# Patient Record
Sex: FEMALE | Race: WHITE
[De-identification: names, ages, dates, MRNs, and addresses within clinical notes are randomized per-mention and may not be internally consistent; named-entity substitution may affect disease eponyms.]

---

## 2020-08-24 ENCOUNTER — HOSPITAL ENCOUNTER (OUTPATIENT)
Dept: HOSPITAL 63 - LAB | Age: 75
Discharge: HOME | End: 2020-08-24
Attending: OBSTETRICS & GYNECOLOGY
Payer: MEDICARE

## 2020-08-24 DIAGNOSIS — N95.1: Primary | ICD-10-CM

## 2020-08-24 DIAGNOSIS — Z79.890: ICD-10-CM

## 2020-08-24 PROCEDURE — 82679 ASSAY OF ESTRONE: CPT

## 2020-08-24 PROCEDURE — 82627 DEHYDROEPIANDROSTERONE: CPT

## 2020-08-24 PROCEDURE — 36415 COLL VENOUS BLD VENIPUNCTURE: CPT

## 2020-08-24 PROCEDURE — 82670 ASSAY OF TOTAL ESTRADIOL: CPT

## 2020-08-24 PROCEDURE — 84144 ASSAY OF PROGESTERONE: CPT

## 2020-08-25 LAB
DHEA-S SERPL-MCNC: 34.8 UG/DL (ref 13.9–142.8)
ESTRADIOL SERPL-MCNC: 14.9 PG/ML
PROGEST SERPL-MCNC: 1.4 NG/ML

## 2021-07-02 ENCOUNTER — HOSPITAL ENCOUNTER (OUTPATIENT)
Dept: HOSPITAL 63 - RAD | Age: 76
End: 2021-07-02
Payer: MEDICARE

## 2021-07-02 DIAGNOSIS — M43.8X4: ICD-10-CM

## 2021-07-02 DIAGNOSIS — C50.412: Primary | ICD-10-CM

## 2021-07-02 PROCEDURE — 71046 X-RAY EXAM CHEST 2 VIEWS: CPT

## 2021-07-02 NOTE — RAD
EXAM: Chest, 2 views.



HISTORY: Breast cancer.



COMPARISON: None.



FINDINGS: 2 views of the chest are obtained. There is bilateral lower lobe and lingular linear atelec
tasis or scarring. There is no consolidation, pleural effusion or pneumothorax. The heart is normal i
n size. There is a chronic mild to moderate wedge compression deformity at an upper thoracic vertebra
l level. There are left axillary clips.



IMPRESSION: Bilateral lower lobe and lingular linear atelectasis or scarring.



Electronically signed by: Jami Aleman MD (7/2/2021 3:55 PM) [id number]

## 2021-08-17 ENCOUNTER — HOSPITAL ENCOUNTER (INPATIENT)
Dept: HOSPITAL 63 - ER | Age: 76
LOS: 3 days | Discharge: HOME | DRG: 872 | End: 2021-08-20
Attending: INTERNAL MEDICINE | Admitting: INTERNAL MEDICINE
Payer: MEDICARE

## 2021-08-17 VITALS — WEIGHT: 160.06 LBS | HEIGHT: 63 IN | BODY MASS INDEX: 28.36 KG/M2

## 2021-08-17 VITALS — SYSTOLIC BLOOD PRESSURE: 117 MMHG | DIASTOLIC BLOOD PRESSURE: 52 MMHG

## 2021-08-17 VITALS — SYSTOLIC BLOOD PRESSURE: 110 MMHG | DIASTOLIC BLOOD PRESSURE: 58 MMHG

## 2021-08-17 VITALS — SYSTOLIC BLOOD PRESSURE: 129 MMHG | DIASTOLIC BLOOD PRESSURE: 48 MMHG

## 2021-08-17 VITALS — DIASTOLIC BLOOD PRESSURE: 58 MMHG | SYSTOLIC BLOOD PRESSURE: 94 MMHG

## 2021-08-17 DIAGNOSIS — I47.1: ICD-10-CM

## 2021-08-17 DIAGNOSIS — C50.919: ICD-10-CM

## 2021-08-17 DIAGNOSIS — I48.91: ICD-10-CM

## 2021-08-17 DIAGNOSIS — E78.5: ICD-10-CM

## 2021-08-17 DIAGNOSIS — Z20.822: ICD-10-CM

## 2021-08-17 DIAGNOSIS — M79.7: ICD-10-CM

## 2021-08-17 DIAGNOSIS — F03.90: ICD-10-CM

## 2021-08-17 DIAGNOSIS — D69.6: ICD-10-CM

## 2021-08-17 DIAGNOSIS — F05: ICD-10-CM

## 2021-08-17 DIAGNOSIS — F32.9: ICD-10-CM

## 2021-08-17 DIAGNOSIS — Z92.3: ICD-10-CM

## 2021-08-17 DIAGNOSIS — Z79.899: ICD-10-CM

## 2021-08-17 DIAGNOSIS — Z88.8: ICD-10-CM

## 2021-08-17 DIAGNOSIS — M19.90: ICD-10-CM

## 2021-08-17 DIAGNOSIS — K21.9: ICD-10-CM

## 2021-08-17 DIAGNOSIS — A41.9: Primary | ICD-10-CM

## 2021-08-17 DIAGNOSIS — J45.909: ICD-10-CM

## 2021-08-17 DIAGNOSIS — G62.9: ICD-10-CM

## 2021-08-17 DIAGNOSIS — E87.2: ICD-10-CM

## 2021-08-17 LAB
% BANDS: 14 % (ref 0–9)
% LYMPHS: 8 % (ref 24–48)
% MONOS: 1 % (ref 0–10)
% SEGS: 76 % (ref 35–66)
ALBUMIN SERPL-MCNC: 3.5 G/DL (ref 3.4–5)
ALBUMIN/GLOB SERPL: 1 {RATIO} (ref 1–1.7)
ALP SERPL-CCNC: 92 U/L (ref 46–116)
ALT SERPL-CCNC: 46 U/L (ref 14–59)
AMORPH SED URNS QL MICRO: PRESENT /HPF
ANION GAP SERPL CALC-SCNC: 9 MMOL/L (ref 6–14)
APTT PPP: YELLOW S
AST SERPL-CCNC: 30 U/L (ref 15–37)
BACTERIA #/AREA URNS HPF: 0 /HPF
BASOPHILS # BLD AUTO: 0.1 X10^3/UL (ref 0–0.2)
BASOPHILS NFR BLD: 1 % (ref 0–3)
BILIRUB SERPL-MCNC: 0.9 MG/DL (ref 0.2–1)
BILIRUB UR QL STRIP: (no result)
BUN/CREAT SERPL: 19 (ref 6–20)
CA-I SERPL ISE-MCNC: 17 MG/DL (ref 7–20)
CALCIUM SERPL-MCNC: 8.2 MG/DL (ref 8.5–10.1)
CHLORIDE SERPL-SCNC: 100 MMOL/L (ref 98–107)
CO2 SERPL-SCNC: 27 MMOL/L (ref 21–32)
CREAT SERPL-MCNC: 0.9 MG/DL (ref 0.6–1)
EOSINOPHIL NFR BLD AUTO: 1 % (ref 0–5)
EOSINOPHIL NFR BLD: 0 % (ref 0–3)
EOSINOPHIL NFR BLD: 0.1 X10^3/UL (ref 0–0.7)
ERYTHROCYTE [DISTWIDTH] IN BLOOD BY AUTOMATED COUNT: 13.5 % (ref 11.5–14.5)
FIBRINOGEN PPP-MCNC: CLEAR MG/DL
GFR SERPLBLD BASED ON 1.73 SQ M-ARVRAT: 60.9 ML/MIN
GLOBULIN SER-MCNC: 3.4 G/DL (ref 2.2–3.8)
GLUCOSE SERPL-MCNC: 87 MG/DL (ref 70–99)
GLUCOSE UR STRIP-MCNC: (no result) MG/DL
HCT VFR BLD CALC: 41.1 % (ref 36–47)
HGB BLD-MCNC: 13.8 G/DL (ref 12–15.5)
LYMPHOCYTES # BLD: 0.7 X10^3/UL (ref 1–4.8)
LYMPHOCYTES NFR BLD AUTO: 4 % (ref 24–48)
MCH RBC QN AUTO: 31 PG (ref 25–35)
MCHC RBC AUTO-ENTMCNC: 34 G/DL (ref 31–37)
MCV RBC AUTO: 93 FL (ref 79–100)
MONO #: 0.7 X10^3/UL (ref 0–1.1)
MONOCYTES NFR BLD: 4 % (ref 0–9)
NEUT #: 14.5 X10^3UL (ref 1.8–7.7)
NEUTROPHILS NFR BLD AUTO: 91 % (ref 31–73)
NITRITE UR QL STRIP: (no result)
PLATELET # BLD AUTO: 141 X10^3/UL (ref 140–400)
PLATELET # BLD EST: ADEQUATE 10*3/UL
POTASSIUM SERPL-SCNC: 4.3 MMOL/L (ref 3.5–5.1)
PROT SERPL-MCNC: 6.9 G/DL (ref 6.4–8.2)
RBC # BLD AUTO: 4.44 X10^6/UL (ref 3.5–5.4)
RBC #/AREA URNS HPF: (no result) /HPF (ref 0–2)
SODIUM SERPL-SCNC: 136 MMOL/L (ref 136–145)
SP GR UR STRIP: 1.02
SQUAMOUS #/AREA URNS LPF: (no result) /LPF
UROBILINOGEN UR-MCNC: 0.2 MG/DL
WBC # BLD AUTO: 16 X10^3/UL (ref 4–11)
WBC #/AREA URNS HPF: 0 /HPF (ref 0–4)

## 2021-08-17 PROCEDURE — 81001 URINALYSIS AUTO W/SCOPE: CPT

## 2021-08-17 PROCEDURE — 70450 CT HEAD/BRAIN W/O DYE: CPT

## 2021-08-17 PROCEDURE — 71045 X-RAY EXAM CHEST 1 VIEW: CPT

## 2021-08-17 PROCEDURE — 80202 ASSAY OF VANCOMYCIN: CPT

## 2021-08-17 PROCEDURE — 82947 ASSAY GLUCOSE BLOOD QUANT: CPT

## 2021-08-17 PROCEDURE — 36415 COLL VENOUS BLD VENIPUNCTURE: CPT

## 2021-08-17 PROCEDURE — U0003 INFECTIOUS AGENT DETECTION BY NUCLEIC ACID (DNA OR RNA); SEVERE ACUTE RESPIRATORY SYNDROME CORONAVIRUS 2 (SARS-COV-2) (CORONAVIRUS DISEASE [COVID-19]), AMPLIFIED PROBE TECHNIQUE, MAKING USE OF HIGH THROUGHPUT TECHNOLOGIES AS DESCRIBED BY CMS-2020-01-R: HCPCS

## 2021-08-17 PROCEDURE — 84443 ASSAY THYROID STIM HORMONE: CPT

## 2021-08-17 PROCEDURE — 96365 THER/PROPH/DIAG IV INF INIT: CPT

## 2021-08-17 PROCEDURE — 80053 COMPREHEN METABOLIC PANEL: CPT

## 2021-08-17 PROCEDURE — 96375 TX/PRO/DX INJ NEW DRUG ADDON: CPT

## 2021-08-17 PROCEDURE — 85610 PROTHROMBIN TIME: CPT

## 2021-08-17 PROCEDURE — 85730 THROMBOPLASTIN TIME PARTIAL: CPT

## 2021-08-17 PROCEDURE — 82803 BLOOD GASES ANY COMBINATION: CPT

## 2021-08-17 PROCEDURE — 93306 TTE W/DOPPLER COMPLETE: CPT

## 2021-08-17 PROCEDURE — 87426 SARSCOV CORONAVIRUS AG IA: CPT

## 2021-08-17 PROCEDURE — 82140 ASSAY OF AMMONIA: CPT

## 2021-08-17 PROCEDURE — 85025 COMPLETE CBC W/AUTO DIFF WBC: CPT

## 2021-08-17 PROCEDURE — 84484 ASSAY OF TROPONIN QUANT: CPT

## 2021-08-17 PROCEDURE — 93005 ELECTROCARDIOGRAM TRACING: CPT

## 2021-08-17 PROCEDURE — 96366 THER/PROPH/DIAG IV INF ADDON: CPT

## 2021-08-17 PROCEDURE — 80061 LIPID PANEL: CPT

## 2021-08-17 PROCEDURE — 83605 ASSAY OF LACTIC ACID: CPT

## 2021-08-17 PROCEDURE — 85007 BL SMEAR W/DIFF WBC COUNT: CPT

## 2021-08-17 PROCEDURE — 83735 ASSAY OF MAGNESIUM: CPT

## 2021-08-17 PROCEDURE — 87040 BLOOD CULTURE FOR BACTERIA: CPT

## 2021-08-17 RX ADMIN — VANCOMYCIN HYDROCHLORIDE PRN EACH: 1 INJECTION, POWDER, LYOPHILIZED, FOR SOLUTION INTRAVENOUS at 22:49

## 2021-08-17 RX ADMIN — PIPERACILLIN SODIUM AND TAZOBACTAM SODIUM SCH MLS/HR: 4; .5 INJECTION, POWDER, LYOPHILIZED, FOR SOLUTION INTRAVENOUS at 21:57

## 2021-08-17 NOTE — RAD
Single AP view of the chest.



Comparison:  7/2/2021.



Indication: Code stroke



Findings: 



Stable degenerative change of bilateral shoulders. The heart is at the upper limits of normal but sta
ble.  There is no pneumothorax or effusion. No air space or interstitial disease.



Impression: 



1. No acute cardiopulmonary process.





Electronically signed by: Bryan Corrales MD (8/17/2021 11:19 AM) [id number]

## 2021-08-17 NOTE — EKG
[facility] [address], [place] [address]

Test Date:    2021               Test Time:    10:35:56

Pat Name:     AMBER SAMS            Department:   

Patient ID:   [id number]           Room:          

Gender:       F                        Technician:   SHAYLEE

:          1945               Requested By: NOLVIA DUVAL

Order Number: [id number]            Reading MD:     

                                 Measurements

Intervals                              Axis          

Rate:         167                      P:            

MD:                                    QRS:          14

QRSD:         116                      T:            24

QT:           284                                    

QTc:          473                                    

                           Interpretive Statements

IRREGULAR RHYTHM, NO P-WAVE FOUND

VENTRICULAR PREMATURE COMPLEX(ES)

R-S TRANSITION ZONE IN V LEADS DISPLACED TO THE RIGHT

INCOMPLETE RIGHT BUNDLE BRANCH BLOCK

RVH WITH REPOLARIZATION ABNORMALITY

QRS(T) CONTOUR ABNORMALITY

CONSISTENT WITH INFERIOR INFARCT

PROBABLY OLD

ABNORMAL ECG

RI6.02

No previous ECG available for comparison

## 2021-08-17 NOTE — PHYS DOC
Past History


Alcohol Use:  None





Adult General


Chief Complaint


Chief Complaint:  NEURO SYMPTOMS/DEFICITS





HPI


HPI





Patient is a 76-year-old female presenting via EMS for altered mental status.  

Patient initially brought in via EMS due to suspect stroke.  Patient's last 

known normal was night prior at 9 PM.  Patient reportedly woke up and was more 

confused and "talking gibberish"per  who lives with patient.  He states 

there was no trauma or falls but admits patient was unstable on her feet and 

having trouble performing simple tasks such as opening the gate in their yard 

which is something she has never struggled with before.  He also reports that at

times, she became incomprehensible when speaking prompting him to contact EMS as

he was concern for potential stroke.   does disclose that patient has 

history of left-sided breast cancer, unsure the name of it, but recently had a 

lumpectomy in June 2021 at [facility] and has been undergoing 

radiation treatments there as well.





Review of Systems


Review of Systems


ROS unobtainable due to patient condition/mentation





Allergies


Allergies





Allergies








Coded Allergies Type Severity Reaction Last Updated Verified


 


  oxycodone Allergy Unknown  8/17/21 Yes











Physical Exam


Physical Exam


Constitutional: 


Patient appears pleasantly confused talking in incomprehensible sentences





HEENT:


Head: Normocephalic and atraumatic.


TMs clear, no hemotympanum


Conjunctivae and EOM are normal. Pupils are equal, round, and reactive to light.


Oropharynx is clear and moist.


No hematomas or lacerations or abrasions to face or scalp


OP clear, no blood, no malocclusion, dentition intact


Nares clear, no nasal septal hematoma


Midface stable





Neck:


C-spine midline nontender, no step-offs





Cardiovascular: 


Normal rate, regular rhythm and normal heart sounds.





Pulmonary/Chest: 


Effort normal and breath sounds normal. No respiratory distress. No wheezes. CTA

bilaterally





Abdominal: 


Soft. Bowel sounds are normal. Pt exhibits no distension. There is no 

tenderness.





Musculoskeletal: 


No bony tenderness to extremities, no deformities, full ROM extremities


Chest wall stable


Pelvis stable and non-tender


No vertebral TTP and spine without stepoffs





Neurological: 


Pt is alert and oriented to person only which is not her baseline


Moving all extremities willfully, able to wiggle all fingers and toes


No meningeal signs/nuchal rigidity


Motor and sensory function fully intact


Downgoing toes bilaterally








Skin: 


Skin is warm and dry. No abrasions, no lacerations.  Patient's left breast is 

red and hot to touch and appears tender with palpation versus contralateral side





Psychiatric: 


Behavior is inappropriate.  Poor judgment and memory.  Normal affect and 

energetic mood.  Unable to fully assess due to underlying mentation





Current Patient Data


Vital Signs





Vital Signs








  Date Time  Temp Pulse Resp B/P (MAP) Pulse Ox O2 Delivery O2 Flow Rate FiO2


 


8/17/21 10:35 101.8 133 16 130/75 97 Room Air  








Vital Signs








  Date Time  Temp Pulse Resp B/P (MAP) Pulse Ox O2 Delivery O2 Flow Rate FiO2


 


8/17/21 17:13  77 16 125/74 (91) 98 Room Air  


 


8/17/21 10:35 101.8       








Lab Results





Current Medications








 Medications


  (Trade)  Dose


 Ordered  Sig/Bryant


 Route


 PRN Reason  Start Time


 Stop Time Status Last Admin


Dose Admin


 


 Acetaminophen


  (Tylenol Supp)  650 mg  1X  ONCE


 [place]


   8/17/21 11:00


 8/17/21 11:05 DC 8/17/21 11:33





 


 Sodium Chloride  1,650 ml @ 


 1,650 mls/hr  Q1H


 IV


   8/17/21 11:00


 8/17/21 21:28 DC 8/17/21 11:41





 


 Sodium Chloride  500 ml @ 


 1,000 mls/hr  PRN Q30MIN  PRN


 IV


 SEE COMMENTS  8/17/21 11:00


 8/20/21 19:34 DC  





 


 Vancomycin HCl


  (Vanco Per


 Pharmacy)  1 each  PRN DAILY  PRN


 MC


 SEE COMMENTS  8/17/21 11:00


 8/20/21 19:34 DC 8/19/21 13:53





 


 Piperacillin Sod/


 Tazobactam Sod


 4.5 gm/Sodium


 Chloride  50 ml @ 


 100 mls/hr  Q6HRS


 IV


   8/17/21 12:00


 8/17/21 21:32 DC 8/17/21 11:40





 


 Sodium Chloride  50 ml @ As


 Directed  STK-MED ONCE


 .ROUTE


   8/17/21 11:22


 8/17/21 11:22 DC  





 


 Piperacillin Sod/


 Tazobactam Sod


  (Zosyn)  4.5 gm  STK-MED ONCE


 IV


   8/17/21 11:22


 8/17/21 11:22 DC  





 


 Vancomycin HCl


 1.75 gm/Sodium


 Chloride  500 ml @ 


 250 mls/hr  1X  ONCE


 IV


   8/17/21 12:30


 8/17/21 14:29 DC 8/17/21 12:46





 


 Diltiazem HCl


  (Cardizem Iv


 Push)  20 mg  1X  ONCE


 IVP


   8/17/21 12:15


 8/17/21 12:20 DC 8/17/21 12:23





 


 Fentanyl Citrate


  (Fentanyl 2ml


 Vial)  75 mcg  1X  ONCE


 IVP


   8/17/21 16:00


 8/17/21 16:04 DC 8/17/21 15:58





 


 Sodium Chloride  1,000 ml @ 


 125 mls/hr  1X  ONCE


 IV


   8/17/21 16:45


 8/18/21 00:44 DC 8/17/21 16:45





 


 Ondansetron HCl


  (Zofran)  4 mg  PRN Q4HRS  PRN


 IVP


 NAUSEA/VOMITING 1st choice  8/17/21 21:00


 8/18/21 20:59 DC  





 


 Diltiazem HCl


  (Cardizem Iv


 Push)  20 mg  1X  ONCE


 IVP


   8/17/21 21:00


 8/17/21 21:00 DC  





 


 Diltiazem HCl 125


 mg/Sodium Chloride  125 ml @ 5


 mls/hr  CONT  PRN


 IV


 SEE I/O RECORD  8/17/21 21:00


 8/17/21 21:00 DC  





 


 Haloperidol


 Lactate


  (Haldol)  5 mg  1X  ONCE


 IVP


   8/17/21 21:00


 8/17/21 21:00 DC  





 


 Fentanyl Citrate


  (Fentanyl 2ml


 Vial)  50 mcg  PRN Q3HRS  ONCE


 IVP


   8/17/21 21:00


 8/17/21 21:00 DC  





 


 Sodium Chloride  1,000 ml @ 


 1,000 mls/hr  1X  ONCE


 IV


   8/17/21 21:00


 8/17/21 21:00 DC  





 


 Sodium Chloride  1,000 ml @ 


 125 mls/hr  1X  ONCE


 IV


   8/17/21 23:00


 8/17/21 21:00 DC  





 


 Diltiazem HCl


  (Cardizem Iv


 Push)  25 mg  STK-MED ONCE


 IVP


   8/17/21 21:09


 8/17/21 21:09 DC  





 


 Sodium Chloride  1,000 ml @ 


 1,000 mls/hr  1X  ONCE


 IV


   8/17/21 21:15


 8/17/21 21:59 DC 8/17/21 21:55





 


 Diltiazem HCl 125


 mg/Sodium Chloride  125 ml @ 5


 mls/hr  CONT  PRN


 IV


 SEE I/O RECORD  8/17/21 22:00


 8/20/21 19:34 DC 8/19/21 05:51





 


 Diltiazem HCl


  (Cardizem Iv


 Push)  20 mg  1X  ONCE


 IVP


   8/17/21 21:00


 8/17/21 21:26 DC 8/17/21 21:10





 


 Haloperidol


 Lactate


  (Haldol)  5 mg  1X  ONCE


 IVP


   8/17/21 21:00


 8/17/21 21:26 DC 8/17/21 21:57





 


 Fentanyl Citrate


  (Fentanyl 2ml


 Vial)  50 mcg  PRN Q3HRS  PRN


 IVP


 PAIN  8/17/21 21:00


 8/20/21 19:34 DC 8/18/21 01:37





 


 Sodium Chloride  1,000 ml @ 


 125 mls/hr  1X  ONCE


 IV


   8/17/21 23:00


 8/18/21 06:59 DC 8/17/21 23:00





 


 Piperacillin Sod/


 Tazobactam Sod


 4.5 gm/Sodium


 Chloride  50 ml @ 


 100 mls/hr  Q6H


 IV


   8/17/21 22:00


 8/20/21 19:34 DC 8/20/21 16:49





 


 Vancomycin HCl 1


 gm/Sodium Chloride  250 ml @ 


 250 mls/hr  Q24H


 IV


   8/18/21 13:00


 8/19/21 13:46 DC 8/19/21 13:41





 


 Vancomycin HCl


  (Vancomycin


 Trough Level)  1 each  1X  ONCE


 MC


   8/19/21 12:30


 8/19/21 12:31 DC 8/19/21 12:30





 


 Lactobacillus


 Rhamnosus


  (Culturelle)  1 cap  BID


 PO


   8/18/21 09:00


 8/20/21 19:34 DC 8/20/21 08:06





 


 Adenosine


  (Adenocard)  6 mg  STK-MED ONCE


 IV


   8/18/21 10:39


 8/18/21 10:39 DC  





 


 Diltiazem HCl


  (Cardizem Iv


 Push)  25 mg  STK-MED ONCE


 IVP


   8/18/21 10:46


 8/18/21 10:46 DC  





 


 Diltiazem HCl


  (Cardizem Iv


 Push)  10 mg  1X  ONCE


 IVP


   8/18/21 11:00


 8/18/21 11:08 DC 8/18/21 11:01





 


 Adenosine


  (Adenocard)  12 mg  1X  ONCE


 IV


   8/18/21 11:00


 8/18/21 11:08 DC 8/18/21 11:00





 


 Digoxin


  (Lanoxin)  500 mcg  1X  ONCE


 IV


   8/18/21 15:30


 8/18/21 15:34 DC 8/18/21 15:38





 


 Digoxin


  (Lanoxin)  125 mcg  DAILY


 PO


   8/19/21 09:00


 8/19/21 09:30 DC  





 


 Diltiazem HCl


  (Cardizem 24hr


 Cd)  240 mg  DAILY


 PO


   8/19/21 09:00


 8/20/21 19:34 DC 8/20/21 08:08





 


 Acetaminophen


  (Tylenol)  325 mg  STK-MED ONCE


 PO


   8/18/21 20:04


 8/18/21 20:04 DC  





 


 Acetaminophen


  (Tylenol)  650 mg  PRN Q6HRS  PRN


 PO


 MILD PAIN / TEMP > 100.3'F  8/18/21 20:15


 8/20/21 19:34 DC 8/20/21 14:37





 


 Metoprolol


 Succinate


  (Toprol Xl)  25 mg  DAILY


 PO


   8/19/21 09:00


 8/19/21 09:30 DC  





 


 Atorvastatin


 Calcium


  (Lipitor)  10 mg  QHS


 PO


   8/19/21 21:00


 8/20/21 19:34 DC 8/19/21 19:42





 


 Pantoprazole


 Sodium


  (Protonix)  40 mg  DAILYAC


 PO


   8/19/21 09:00


 8/20/21 19:34 DC 8/20/21 08:08





 


 Digoxin


  (Lanoxin)  250 mcg  1X  ONCE


 IV


   8/19/21 12:15


 8/19/21 12:16 DC 8/19/21 12:08





 


 Vancomycin HCl 1


 gm/Sodium Chloride  250 ml @ 


 250 mls/hr  Q12H


 IV


   8/20/21 01:00


 8/20/21 19:34 DC 8/20/21 00:51





 


 Vancomycin HCl


  (Vancomycin


 Trough Level)  1 each  1X  ONCE


 MC


   8/21/21 12:30


 8/20/21 19:34 DC  





 


 Amiodarone HCl


  (Cordarone)  200 mg  BID


 PO


   8/19/21 17:00


 8/20/21 19:34 DC 8/20/21 08:07





 


 Aspirin


  (Aspirin Enteric


 Coated)  81 mg  DAILYWBKFT


 PO


   8/20/21 08:00


 8/20/21 19:34 DC 8/20/21 08:06





 


 Amiodarone HCl


 150 mg/Dextrose  103 ml @ 


 618 mls/hr  1X  ONCE


 IVP


   8/19/21 18:00


 8/19/21 18:09 DC 8/19/21 19:40





 


 Amiodarone HCl


 150 mg/Dextrose  103 ml @ 


 600 mls/hr  1X  ONCE


 IV


   8/19/21 18:00


 8/19/21 18:08 DC  





 


 Amiodarone HCl


 450 mg/Dextrose  259 ml @ 


 33.33 mls/


 hr  CONT  PRN


 IV


 SEE I/O RECORD  8/19/21 18:00


 8/20/21 17:59 DC 8/20/21 05:05





 


 Amiodarone HCl


  (Cordarone)  150 mg  STK-MED ONCE


 IVP


   8/20/21 02:32


 8/20/21 02:32 DC  














EKG


EKG


Initial EKG ordered and interpreted by myself at 1036 hrs. as in irregular 

rhythm at 167 bpm, prolonged QTC at 473 otherwise unremarkable intervals, no 

axis deviation, gross artifact due to tremors with concern for T wave inversion 

in leads V1 through V4, no obvious STEMI


Repeat EKG ordered and interpreted by myself at 1245 hrs. as sinus rhythm at 95 

bpm with several PVCs present, prolonged QTC at 468 otherwise unremarkable 

intervals, left axis deviation, T wave inversion noted in leads V1 through V3 

otherwise no obvious ischemic findings, no STEMI


Repeat EKG ordered and interpreted by myself at 1747 hrs. as sinus rhythm at 80 

bpm, unremarkable intervals, persistent T wave inversions noted in leads V1 

through V3 otherwise no acute ischemic findings, no STEMI





Radiology/Procedures


Radiology/Procedures





CT HEAD





INDICATION: Code stroke





COMPARISON: None Available.





Exposure: One or more of the following individualized dose reduction techniques 

were utilized for this examination:  1. Automated exposure control  2. 

Adjustment of the mA and/or kV according to patient size  3. Use of iterative 

reconstruction technique





TECHNIQUE: 5 mm contiguous axial images were obtained from the skull base to the

vertex in both bone and soft tissue algorithm.  





FINDINGS: 


Mild prominent bilateral periventricular white matter hypodensities likely 

chronic small vessel ischemic disease  





No evidence of acute intracranial hemorrhage.   No extra-axial fluid 

collections.





No mass effect or midline shift. The lateral ventricles are prominent.  Basal 

cisterns are patent.





No fractures identified.Gray-white differentiation is preserved.Globes and 

orbits are within normal limits.   Paranasal sinuses and mastoid air cells are 

clear.   





IMPRESSION:








1. No acute intracranial findings.





2. Mild prominent appearing lateral ventricles could be age related or normal 

pressure hydrocephalus. 





/////////////////////////////


Single AP view of the chest.





Comparison:  7/2/2021.





Indication: Code stroke





Findings: 





Stable degenerative change of bilateral shoulders. The heart is at the upper 

limits of normal but stable.  There is no pneumothorax or effusion. No air space

or interstitial disease.





Impression: 





1. No acute cardiopulmonary process.








Electronically signed by: Bryan Corrales MD (8/17/2021 11:19 AM) [id number]











Heart Score


C/O Chest Pain:  N/A


HEART Score for Chest Pain:  








HEART Score for Chest Pain Response (Comments) Value


 


History Moderately Suspicious 1


 


ECG Significant ST Depression 2


 


Age > 65 2


 


Risk Factors >3 Risk Factors or Hx CAD 2


 


Troponin < Normal Limit 0


 


Total  7








Risk Factors:


Risk Factors:  DM, Current or recent (<one month) smoker, HTN, HLP, family 

history of CAD, obesity.


Risk Scores:


Risk Factors:  DM, Current or recent (<one month) smoker, HTN, HLP, family 

history of CAD, obesity.





Course & Med Decision Making


Course & Med Decision Making


Code stroke initially called on arrival with unremarkable CT head and POC 

glucose obtained


Patient brought back to ER room for evaluation when formal vitals obtained 

concerning for tachycardia and fever.  Clinical presentation more consistent 

with infectious etiology versus stroke


History limited, physical examination and ER work-up concerning for sepsis with 

source being cellulitis likely causing patient to be delirious.  Appropriate 

sepsis guidelines obtained, IV fluids, lactic, blood cultures, and IV 

antibiotics initiated


Patient did have episode of atrial fibrillation with RVR which was unknown to 

her and , patient converted back to normal sinus rhythm after Cardizem 

bolus


Attempts were made to transfer patient to [facility]; however, 

due to current COVID-19 pandemic hospital was full and there was a lengthy wait 

time


While waiting transfer, patient tolerated ER intervention provided well with 

improvement in mentation back to AAOx3 state.  Decision was made to contact 

admitting hospitalist at [facility] who ultimately accepted 

patient at [facility] for immediate admission


I updated both patient and  on plan of care, both were amenable.  All 

questions and concerns addressed.  Patient full CODE STATUS on hospital 

admission








Critical Care Time


This patient required critical care. Due to the fact that the patient required a

significant amount of one on one physician - patient contact time, ordering and 

review of studies, arranging urgent treatment with development of a management 

plan, evaluation of patients response to treatment with frequent reassessments,

and discussions with other providers this patient required 50 minutes of 

critical care time. Critical care time was indicated due to the inherent 

instability and/or potential for instability in this patient. The critical care 

time that is allocated to this patient is above and beyond any time spent on any

other billable procedures performed on this patient.





Dragon Disclaimer


Dragon Disclaimer


This electronic medical record was generated, in whole or in part, using a voice

recognition dictation system.





Departure


Departure:


Impression:  


   Primary Impression:  


   Sepsis due to cellulitis


   Additional Impressions:  


   Breast cancer


   Delirium due to medical condition with behavioral disturbance


Disposition:  09 ADMITTED AS INPATIENT


Admitting Physician:  Venancio Ames


Referrals:  


PORFIRIO FARRELL MD (PCP)





Problem Qualifiers











NOLVIA DUVAL DO                 Aug 17, 2021 10:43

## 2021-08-17 NOTE — RAD
CT HEAD



INDICATION: Code stroke



COMPARISON: None Available.



Exposure: One or more of the following individualized dose reduction techniques were utilized for thi
s examination:  1. Automated exposure control  2. Adjustment of the mA and/or kV according to patient
 size  3. Use of iterative reconstruction technique



TECHNIQUE: 5 mm contiguous axial images were obtained from the skull base to the vertex in both bone 
and soft tissue algorithm.  



FINDINGS: 

Mild prominent bilateral periventricular white matter hypodensities likely chronic small vessel ische
earlene disease  



No evidence of acute intracranial hemorrhage.   No extra-axial fluid collections.



No mass effect or midline shift. The lateral ventricles are prominent.  Basal cisterns are patent.



No fractures identified.Gray-white differentiation is preserved.Globes and orbits are within normal l
imits.   Paranasal sinuses and mastoid air cells are clear.   



IMPRESSION:





1. No acute intracranial findings.



2. Mild prominent appearing lateral ventricles could be age related or normal pressure hydrocephalus.
 





**********FOR INTERNAL CODING PURPOSES**********



Critical result:



Findings discussed with  ER physician at 8/17/2021 10:32 AM.



RESULT CODE: (C)  



  







Electronically signed by: Bryan Middleton MD (8/17/2021 10:37 AM) [id number]

## 2021-08-17 NOTE — EKG
[facility] [address], [place] [address]

Test Date:    2021               Test Time:    12:38:38

Pat Name:     AMBER SAMS            Department:   

Patient ID:   [id number]           Room:          

Gender:       F                        Technician:   SHAYLEE

:          1945               Requested By: NOLVIA DUVAL

Order Number: [id number]            Reading MD:     

                                 Measurements

Intervals                              Axis          

Rate:         95                       P:            88

IL:           140                      QRS:          3

QRSD:         116                      T:            28

QT:           370                                    

QTc:          468                                    

                           Interpretive Statements

SINUS RHYTHM

ATRIAL PREMATURE COMPLEX(ES)

R-S TRANSITION ZONE IN V LEADS DISPLACED TO THE RIGHT

INCOMPLETE RIGHT BUNDLE BRANCH BLOCK

RVH WITH REPOLARIZATION ABNORMALITY

QRS(T) CONTOUR ABNORMALITY

CONSIDER INFERIOR INFARCT

ABNORMAL ECG

RI6.02

No previous ECG available for comparison

## 2021-08-18 VITALS — SYSTOLIC BLOOD PRESSURE: 130 MMHG | DIASTOLIC BLOOD PRESSURE: 63 MMHG

## 2021-08-18 VITALS — SYSTOLIC BLOOD PRESSURE: 144 MMHG | DIASTOLIC BLOOD PRESSURE: 62 MMHG

## 2021-08-18 VITALS — SYSTOLIC BLOOD PRESSURE: 114 MMHG | DIASTOLIC BLOOD PRESSURE: 42 MMHG

## 2021-08-18 VITALS — DIASTOLIC BLOOD PRESSURE: 69 MMHG | SYSTOLIC BLOOD PRESSURE: 142 MMHG

## 2021-08-18 VITALS — SYSTOLIC BLOOD PRESSURE: 137 MMHG | DIASTOLIC BLOOD PRESSURE: 78 MMHG

## 2021-08-18 VITALS — SYSTOLIC BLOOD PRESSURE: 132 MMHG | DIASTOLIC BLOOD PRESSURE: 51 MMHG

## 2021-08-18 VITALS — SYSTOLIC BLOOD PRESSURE: 111 MMHG | DIASTOLIC BLOOD PRESSURE: 74 MMHG

## 2021-08-18 VITALS — SYSTOLIC BLOOD PRESSURE: 135 MMHG | DIASTOLIC BLOOD PRESSURE: 60 MMHG

## 2021-08-18 VITALS — DIASTOLIC BLOOD PRESSURE: 48 MMHG | SYSTOLIC BLOOD PRESSURE: 118 MMHG

## 2021-08-18 VITALS — SYSTOLIC BLOOD PRESSURE: 108 MMHG | DIASTOLIC BLOOD PRESSURE: 66 MMHG

## 2021-08-18 VITALS — DIASTOLIC BLOOD PRESSURE: 57 MMHG | SYSTOLIC BLOOD PRESSURE: 131 MMHG

## 2021-08-18 VITALS — DIASTOLIC BLOOD PRESSURE: 68 MMHG | SYSTOLIC BLOOD PRESSURE: 122 MMHG

## 2021-08-18 VITALS — SYSTOLIC BLOOD PRESSURE: 118 MMHG | DIASTOLIC BLOOD PRESSURE: 57 MMHG

## 2021-08-18 VITALS — SYSTOLIC BLOOD PRESSURE: 106 MMHG | DIASTOLIC BLOOD PRESSURE: 67 MMHG

## 2021-08-18 VITALS — DIASTOLIC BLOOD PRESSURE: 67 MMHG | SYSTOLIC BLOOD PRESSURE: 119 MMHG

## 2021-08-18 VITALS — DIASTOLIC BLOOD PRESSURE: 57 MMHG | SYSTOLIC BLOOD PRESSURE: 130 MMHG

## 2021-08-18 VITALS — SYSTOLIC BLOOD PRESSURE: 112 MMHG | DIASTOLIC BLOOD PRESSURE: 50 MMHG

## 2021-08-18 VITALS — DIASTOLIC BLOOD PRESSURE: 92 MMHG | SYSTOLIC BLOOD PRESSURE: 149 MMHG

## 2021-08-18 VITALS — DIASTOLIC BLOOD PRESSURE: 63 MMHG | SYSTOLIC BLOOD PRESSURE: 125 MMHG

## 2021-08-18 VITALS — SYSTOLIC BLOOD PRESSURE: 137 MMHG | DIASTOLIC BLOOD PRESSURE: 52 MMHG

## 2021-08-18 VITALS — SYSTOLIC BLOOD PRESSURE: 121 MMHG | DIASTOLIC BLOOD PRESSURE: 56 MMHG

## 2021-08-18 VITALS — DIASTOLIC BLOOD PRESSURE: 61 MMHG | SYSTOLIC BLOOD PRESSURE: 108 MMHG

## 2021-08-18 VITALS — DIASTOLIC BLOOD PRESSURE: 58 MMHG | SYSTOLIC BLOOD PRESSURE: 132 MMHG

## 2021-08-18 VITALS — SYSTOLIC BLOOD PRESSURE: 142 MMHG | DIASTOLIC BLOOD PRESSURE: 53 MMHG

## 2021-08-18 VITALS — DIASTOLIC BLOOD PRESSURE: 60 MMHG | SYSTOLIC BLOOD PRESSURE: 142 MMHG

## 2021-08-18 VITALS — DIASTOLIC BLOOD PRESSURE: 48 MMHG | SYSTOLIC BLOOD PRESSURE: 136 MMHG

## 2021-08-18 VITALS — SYSTOLIC BLOOD PRESSURE: 122 MMHG | DIASTOLIC BLOOD PRESSURE: 54 MMHG

## 2021-08-18 VITALS — SYSTOLIC BLOOD PRESSURE: 131 MMHG | DIASTOLIC BLOOD PRESSURE: 63 MMHG

## 2021-08-18 LAB
ALBUMIN SERPL-MCNC: 2.7 G/DL (ref 3.4–5)
ALBUMIN/GLOB SERPL: 0.8 {RATIO} (ref 1–1.7)
ALP SERPL-CCNC: 77 U/L (ref 46–116)
ALT SERPL-CCNC: 33 U/L (ref 14–59)
ANION GAP SERPL CALC-SCNC: 12 MMOL/L (ref 6–14)
AST SERPL-CCNC: 25 U/L (ref 15–37)
BASOPHILS # BLD AUTO: 0 X10^3/UL (ref 0–0.2)
BASOPHILS NFR BLD: 0 % (ref 0–3)
BILIRUB SERPL-MCNC: 1.1 MG/DL (ref 0.2–1)
BUN/CREAT SERPL: 12 (ref 6–20)
CA-I SERPL ISE-MCNC: 12 MG/DL (ref 7–20)
CALCIUM SERPL-MCNC: 7.4 MG/DL (ref 8.5–10.1)
CHLORIDE SERPL-SCNC: 103 MMOL/L (ref 98–107)
CO2 SERPL-SCNC: 24 MMOL/L (ref 21–32)
CREAT SERPL-MCNC: 1 MG/DL (ref 0.6–1)
EOSINOPHIL NFR BLD: 0 % (ref 0–3)
EOSINOPHIL NFR BLD: 0 X10^3/UL (ref 0–0.7)
ERYTHROCYTE [DISTWIDTH] IN BLOOD BY AUTOMATED COUNT: 13.6 % (ref 11.5–14.5)
GFR SERPLBLD BASED ON 1.73 SQ M-ARVRAT: 53.9 ML/MIN
GLOBULIN SER-MCNC: 3.2 G/DL (ref 2.2–3.8)
GLUCOSE SERPL-MCNC: 119 MG/DL (ref 70–99)
HCT VFR BLD CALC: 36.4 % (ref 36–47)
HGB BLD-MCNC: 12 G/DL (ref 12–15.5)
LYMPHOCYTES # BLD: 0.7 X10^3/UL (ref 1–4.8)
LYMPHOCYTES NFR BLD AUTO: 5 % (ref 24–48)
MCH RBC QN AUTO: 31 PG (ref 25–35)
MCHC RBC AUTO-ENTMCNC: 33 G/DL (ref 31–37)
MCV RBC AUTO: 94 FL (ref 79–100)
MONO #: 0.6 X10^3/UL (ref 0–1.1)
MONOCYTES NFR BLD: 5 % (ref 0–9)
NEUT #: 11.6 X10^3UL (ref 1.8–7.7)
NEUTROPHILS NFR BLD AUTO: 90 % (ref 31–73)
PLATELET # BLD AUTO: 116 X10^3/UL (ref 140–400)
POTASSIUM SERPL-SCNC: 3.3 MMOL/L (ref 3.5–5.1)
PROT SERPL-MCNC: 5.9 G/DL (ref 6.4–8.2)
RBC # BLD AUTO: 3.88 X10^6/UL (ref 3.5–5.4)
SODIUM SERPL-SCNC: 139 MMOL/L (ref 136–145)
WBC # BLD AUTO: 13 X10^3/UL (ref 4–11)

## 2021-08-18 RX ADMIN — Medication SCH CAP: at 09:00

## 2021-08-18 RX ADMIN — ACETAMINOPHEN PRN MG: 325 TABLET, FILM COATED ORAL at 20:34

## 2021-08-18 RX ADMIN — PIPERACILLIN SODIUM AND TAZOBACTAM SODIUM SCH MLS/HR: 4; .5 INJECTION, POWDER, LYOPHILIZED, FOR SOLUTION INTRAVENOUS at 03:55

## 2021-08-18 RX ADMIN — PIPERACILLIN SODIUM AND TAZOBACTAM SODIUM SCH MLS/HR: 4; .5 INJECTION, POWDER, LYOPHILIZED, FOR SOLUTION INTRAVENOUS at 22:10

## 2021-08-18 RX ADMIN — VANCOMYCIN HYDROCHLORIDE SCH MLS/HR: 1 INJECTION, POWDER, LYOPHILIZED, FOR SOLUTION INTRAVENOUS at 14:33

## 2021-08-18 RX ADMIN — PIPERACILLIN SODIUM AND TAZOBACTAM SODIUM SCH MLS/HR: 4; .5 INJECTION, POWDER, LYOPHILIZED, FOR SOLUTION INTRAVENOUS at 10:59

## 2021-08-18 RX ADMIN — Medication SCH CAP: at 20:34

## 2021-08-18 RX ADMIN — PIPERACILLIN SODIUM AND TAZOBACTAM SODIUM SCH MLS/HR: 4; .5 INJECTION, POWDER, LYOPHILIZED, FOR SOLUTION INTRAVENOUS at 18:03

## 2021-08-18 RX ADMIN — PANTOPRAZOLE SODIUM PRN MLS/HR: 40 INJECTION, POWDER, FOR SOLUTION INTRAVENOUS at 10:56

## 2021-08-18 NOTE — HP
ADMIT DATE: 08/17/2021

ADMISSION HISTORY AND PHYSICAL



ATTENDING PHYSICIAN:  Sukhdeep SINCLAIR M.D.



CHIEF COMPLAINT:  Weakness and fast heart rate.



HISTORY OF PRESENT ILLNESS:  The patient is a 76-year-old female admitted 

through the ED with vague symptoms of weakness, significant tachycardia, and 

cellulitis.  She was initially diagnosed with sepsis, cultures are pending.  She

was started on Zosyn as well as vancomycin for cellulitis involving the skin of 

the left breast.  She has also been getting radiation therapy. In the ICU, she 

was found to have a supraventricular tachycardia up to 209 beats per minute.  

Last night, she was given a dose of Cardizem.  Today, she is still tachycardic, 

requiring a dose of Adenocard.  I am in the process of starting a Cardizem drip.

 She is demented and cannot give history.   is at home and cannot come 

in.  I will try to contact him later.  She has a stable blood pressure and 

hemodynamically perfusing.



PAST MEDICAL HISTORY:  Significant for primary carcinoma of the breast, getting 

radiation therapy.  She has underlying dementia, gastroesophageal reflux 

disease, and hyperlipidemia.



CURRENT MEDICATIONS:  Include Cymbalta, fluoxetine, Neurontin, hydrocodone, 

Namenda, metoprolol only 25 mg daily, omeprazole, and Zocor.



ALLERGIES:  SHE HAS ALLERGIES TO OXYCODONE, REACTION IS UNCLEAR.



SOCIAL HISTORY:  Nonsmoker, nondrinker.



FAMILY HISTORY:  Unobtainable.



REVIEW OF SYSTEMS:  Significant for generalized weakness.  She has been getting 

radiation therapy.  She is lethargic.  She has underlying dementia.  All other 

systems reviewed and turned to be negative.



PHYSICAL EXAMINATION:

GENERAL:  When I saw her, this is a pleasantly confused elderly female.

VITAL SIGNS:  Showed a blood pressure 131/57, pulse ranged between 70 and 200, 

temperature 99.0 degrees Fahrenheit, oxygen saturation 96% on 2 L by nasal 

cannula.

HEENT:  Head is without trauma.  Pupils are reactive.  Sclerae nonicteric.  

Oropharynx is clear.

NECK:  Supple, no bruits identified.

LUNGS:  Shallow respirations.

CARDIOVASCULAR:  Showed distant heart tones.  Thready pulse, tachycardic rhythm.

ABDOMEN:  Soft.

EXTREMITIES:  Showed trace edema.

SKIN:  Examination of skin shows redness and erythema involving the entire skin 

surrounding the left breast, there is minimal drainage.  I did not palpate any 

axillary lymph nodes.

NEUROLOGIC:  Pleasantly confused.  She had no focal deficits.  Speech was 

fluent.



PERTINENT LABORATORY STUDIES:  Her hemoglobin is 13.8 g, white count 16,000.  

Her potassium is 3.3 ____, creatinine 1.0.  Cardiac enzymes negative for 

coronary ischemia.  Cultures are pending.



ASSESSMENT:

1.  A 76-year-old female with cellulitis of the skin involving the left breast.

2.  History of carcinoma of the breast, getting radiation therapy.

3.  History of supraventricular tachycardia.

4.  Underlying depression.

5.  Dementia.

6.  Gastroesophageal reflux disease.

7.  Hyperlipidemia.



PLAN:

1.  Admit to the ICU.

2.  Adenocard and Cardizem has been ordered to control her heart rate.

3.  Formal Cardiology consultation to help manage her rhythm.

4.  Intravenous vancomycin and Zosyn has been ordered and will be continued.

5.  Continue home medications.

6.  I will contact the  to ascertain her code status.  Her prognosis is 

guarded.







TASIA/GRECIA/JIMMY

DR: Lee   DD: 08/18/2021 10:50

DT: 08/18/2021 11:39   TID: [id number]

## 2021-08-19 VITALS — SYSTOLIC BLOOD PRESSURE: 130 MMHG | DIASTOLIC BLOOD PRESSURE: 62 MMHG

## 2021-08-19 VITALS — DIASTOLIC BLOOD PRESSURE: 53 MMHG | SYSTOLIC BLOOD PRESSURE: 129 MMHG

## 2021-08-19 VITALS — SYSTOLIC BLOOD PRESSURE: 151 MMHG | DIASTOLIC BLOOD PRESSURE: 59 MMHG

## 2021-08-19 VITALS — SYSTOLIC BLOOD PRESSURE: 97 MMHG | DIASTOLIC BLOOD PRESSURE: 80 MMHG

## 2021-08-19 VITALS — SYSTOLIC BLOOD PRESSURE: 136 MMHG | DIASTOLIC BLOOD PRESSURE: 66 MMHG

## 2021-08-19 VITALS — DIASTOLIC BLOOD PRESSURE: 56 MMHG | SYSTOLIC BLOOD PRESSURE: 130 MMHG

## 2021-08-19 VITALS — DIASTOLIC BLOOD PRESSURE: 62 MMHG | SYSTOLIC BLOOD PRESSURE: 140 MMHG

## 2021-08-19 VITALS — SYSTOLIC BLOOD PRESSURE: 137 MMHG | DIASTOLIC BLOOD PRESSURE: 65 MMHG

## 2021-08-19 VITALS — SYSTOLIC BLOOD PRESSURE: 150 MMHG | DIASTOLIC BLOOD PRESSURE: 73 MMHG

## 2021-08-19 VITALS — SYSTOLIC BLOOD PRESSURE: 133 MMHG | DIASTOLIC BLOOD PRESSURE: 65 MMHG

## 2021-08-19 VITALS — DIASTOLIC BLOOD PRESSURE: 72 MMHG | SYSTOLIC BLOOD PRESSURE: 136 MMHG

## 2021-08-19 VITALS — DIASTOLIC BLOOD PRESSURE: 56 MMHG | SYSTOLIC BLOOD PRESSURE: 137 MMHG

## 2021-08-19 VITALS — DIASTOLIC BLOOD PRESSURE: 58 MMHG | SYSTOLIC BLOOD PRESSURE: 132 MMHG

## 2021-08-19 VITALS — SYSTOLIC BLOOD PRESSURE: 138 MMHG | DIASTOLIC BLOOD PRESSURE: 72 MMHG

## 2021-08-19 VITALS — SYSTOLIC BLOOD PRESSURE: 139 MMHG | DIASTOLIC BLOOD PRESSURE: 65 MMHG

## 2021-08-19 VITALS — DIASTOLIC BLOOD PRESSURE: 63 MMHG | SYSTOLIC BLOOD PRESSURE: 137 MMHG

## 2021-08-19 VITALS — DIASTOLIC BLOOD PRESSURE: 65 MMHG | SYSTOLIC BLOOD PRESSURE: 137 MMHG

## 2021-08-19 LAB — VANC TR: 3.8 MCG/ML (ref 10–20)

## 2021-08-19 RX ADMIN — Medication SCH CAP: at 19:43

## 2021-08-19 RX ADMIN — VANCOMYCIN HYDROCHLORIDE PRN EACH: 1 INJECTION, POWDER, LYOPHILIZED, FOR SOLUTION INTRAVENOUS at 13:53

## 2021-08-19 RX ADMIN — PIPERACILLIN SODIUM AND TAZOBACTAM SODIUM SCH MLS/HR: 4; .5 INJECTION, POWDER, LYOPHILIZED, FOR SOLUTION INTRAVENOUS at 22:08

## 2021-08-19 RX ADMIN — PIPERACILLIN SODIUM AND TAZOBACTAM SODIUM SCH MLS/HR: 4; .5 INJECTION, POWDER, LYOPHILIZED, FOR SOLUTION INTRAVENOUS at 04:22

## 2021-08-19 RX ADMIN — VANCOMYCIN HYDROCHLORIDE SCH MLS/HR: 1 INJECTION, POWDER, LYOPHILIZED, FOR SOLUTION INTRAVENOUS at 13:41

## 2021-08-19 RX ADMIN — PANTOPRAZOLE SODIUM SCH MG: 40 TABLET, DELAYED RELEASE ORAL at 10:33

## 2021-08-19 RX ADMIN — ACETAMINOPHEN PRN MG: 325 TABLET, FILM COATED ORAL at 12:08

## 2021-08-19 RX ADMIN — PANTOPRAZOLE SODIUM PRN MLS/HR: 40 INJECTION, POWDER, FOR SOLUTION INTRAVENOUS at 05:51

## 2021-08-19 RX ADMIN — AMIODARONE HYDROCHLORIDE SCH MG: 200 TABLET ORAL at 17:19

## 2021-08-19 RX ADMIN — ACETAMINOPHEN PRN MG: 325 TABLET, FILM COATED ORAL at 19:42

## 2021-08-19 RX ADMIN — AMIODARONE HYDROCHLORIDE PRN MLS/HR: 50 INJECTION, SOLUTION INTRAVENOUS at 19:48

## 2021-08-19 RX ADMIN — PIPERACILLIN SODIUM AND TAZOBACTAM SODIUM SCH MLS/HR: 4; .5 INJECTION, POWDER, LYOPHILIZED, FOR SOLUTION INTRAVENOUS at 10:34

## 2021-08-19 RX ADMIN — AMIODARONE HYDROCHLORIDE SCH MG: 200 TABLET ORAL at 20:06

## 2021-08-19 RX ADMIN — Medication SCH CAP: at 10:33

## 2021-08-19 RX ADMIN — PIPERACILLIN SODIUM AND TAZOBACTAM SODIUM SCH MLS/HR: 4; .5 INJECTION, POWDER, LYOPHILIZED, FOR SOLUTION INTRAVENOUS at 17:19

## 2021-08-19 NOTE — PN
DATE: 08/19/2021

ATTENDING PHYSICIAN:  Dr. Ace.



SUBJECTIVE:  The patient is pleasantly confused.  She is not in any distress.  

She is comfortable.  She denied any pain or discomfort.



OBJECTIVE FINDINGS:

VITAL SIGNS:  Blood pressure this morning is 129/53 mmHg, pulse is now down to 

76 and regular, temperature 97.8 degrees Fahrenheit, oxygen saturation 96% on 

room air.

HEENT:  Head is without trauma.  Pupils are reactive.  Sclerae is nonicteric.  

Oropharynx clear.

NECK:  Supple, no bruits.

LUNGS:  Good breath sounds.

BREASTS:  I did examine the skin in the left breast, the redness and induration 

is improved.  It is less tender and indurated.  Cellulitis is receding.

ABDOMEN:  Soft.

EXTREMITIES:  Without edema.

NEUROLOGIC:  Focally intact, but she is pleasantly confused.



ASSESSMENT:

1.  A 76-year-old female with cellulitis and mastitis, left breast.

2.  History of breast cancer with recent radiation therapy.

3.  Supraventricular tachycardia/atrial fibrillation with now controlled 

ventricular rate.

4.  Underlying depression.

5.  Dementia.

6.  Gastroesophageal reflux disease.

7.  Hyperlipidemia.



PLAN:

1.  Keep in ICU.

2.  Recs per Cardiology.  We switched over to Cardizem.

3.  Continue antibiotics as ordered.

4.  Discharge planning for later this week.







DANIEL

DR: [email or web address]   DD: 08/19/2021 10:16

DT: 08/19/2021 20:16   TID: [id number]

CC:     PORFIRIO FARRELL MD

## 2021-08-19 NOTE — PDOC2
CARDIAC CONSULT


DATE OF CONSULT


DOS:


DATE: 8/19/21 


TIME: 08:37





REASON FOR CONSULT


Reason for Consult


SVT





REFERRING PHYSICIAN


Referring Physician


Dr. Ace





SOURCE


Source:  Chart review, Patient (and  )





HPI


History of Present Illness


This is a 75 yo female who presented secondary to weakness and confusion. 

Patient was recently diagnosed with breast CA. Underwent left breast lumpectomy 

and has been receiving radiation therapy for the last 4 weeks. Recent developed 

redness of the left breast. The morning of arrival, was very confused and weak 

so  called EMS and was brought to the ED for further evaluation and 

treatment. Was noted with leukocytosis, lactic acidosis, and fevers. Blood 

cultures were obtained and she was initiated on antibiotic therapy with concerns

of sepsis. She was noted in AFIB with RVR, which prompted this consult. Was 

started on Cardizem gtt and given bolus of IV Digoxin and converted back to SR 

and has maintained overnight. No previous h/o AFIB. 


Patient reports feeling much better this morning and mentation has improved. She

denies any chest pain, palpitations, dizziness, or diaphoresis.





PAST MEDICAL HISTORY


Cardiovascular:  No pertinent hx


Pulmonary:  Asthma


CENTRAL NERVOUS SYSTEM:  Periperal neuropathy


Heme/Onc:  Cancer (breast )


Musculoskeletal:  Osteoarthritis


Rheumatologic:  Fibromyalgia





PAST SURGICAL HISTORY


Past Surgical History:  Colectomy





FAMILY HISTORY


Family History:  Other





SOCIAL HISTORY


Smoke:  No


ALCOHOL:  none


Drugs:  None


Lives:  with Family





CURRENT MEDICATIONS


Current Medications





Current Medications


Acetaminophen (Tylenol Supp) 650 mg 1X  ONCE SC  Last administered on 8/17/21at 

11:33;  Start 8/17/21 at 11:00;  Stop 8/17/21 at 11:05;  Status DC


Sodium Chloride 1,650 ml @  1,650 mls/hr Q1H IV  Last administered on 8/17/21at 

11:41;  Start 8/17/21 at 11:00;  Stop 8/17/21 at 21:28;  Status DC


Sodium Chloride 500 ml @  1,000 mls/hr PRN Q30MIN  PRN IV SEE COMMENTS;  Start 

8/17/21 at 11:00


Vancomycin HCl (Vanco Per Pharmacy) 1 each PRN DAILY  PRN MC SEE COMMENTS Last 

administered on 8/17/21at 22:49;  Start 8/17/21 at 11:00


Piperacillin Sod/ Tazobactam Sod 4.5 gm/Sodium Chloride 50 ml @  100 mls/hr 

Q6HRS IV  Last administered on 8/17/21at 11:40;  Start 8/17/21 at 12:00;  Stop 

8/17/21 at 21:32;  Status DC


Sodium Chloride 50 ml @ As Directed STK-MED ONCE .ROUTE ;  Start 8/17/21 at 

11:22;  Stop 8/17/21 at 11:22;  Status DC


Piperacillin Sod/ Tazobactam Sod (Zosyn) 4.5 gm STK-MED ONCE IV ;  Start 8/17/21

at 11:22;  Stop 8/17/21 at 11:22;  Status DC


Vancomycin HCl 1.75 gm/Sodium Chloride 500 ml @  250 mls/hr 1X  ONCE IV  Last 

administered on 8/17/21at 12:46;  Start 8/17/21 at 12:30;  Stop 8/17/21 at 

14:29;  Status DC


Diltiazem HCl (Cardizem Iv Push) 20 mg 1X  ONCE IVP  Last administered on 

8/17/21at 12:23;  Start 8/17/21 at 12:15;  Stop 8/17/21 at 12:20;  Status DC


Fentanyl Citrate (Fentanyl 2ml Vial) 75 mcg 1X  ONCE IVP  Last administered on 

8/17/21at 15:58;  Start 8/17/21 at 16:00;  Stop 8/17/21 at 16:04;  Status DC


Sodium Chloride 1,000 ml @  125 mls/hr 1X  ONCE IV  Last administered on 

8/17/21at 16:45;  Start 8/17/21 at 16:45;  Stop 8/18/21 at 00:44;  Status DC


Ondansetron HCl (Zofran) 4 mg PRN Q4HRS  PRN IVP NAUSEA/VOMITING 1st choice;  

Start 8/17/21 at 21:00;  Stop 8/18/21 at 20:59;  Status DC


Diltiazem HCl (Cardizem Iv Push) 20 mg 1X  ONCE IVP ;  Start 8/17/21 at 21:00;  

Stop 8/17/21 at 21:00;  Status DC


Diltiazem HCl 125 mg/Sodium Chloride 125 ml @ 5 mls/hr CONT  PRN IV SEE I/O 

RECORD;  Start 8/17/21 at 21:00;  Stop 8/17/21 at 21:00;  Status DC


Haloperidol Lactate (Haldol) 5 mg 1X  ONCE IVP ;  Start 8/17/21 at 21:00;  Stop 

8/17/21 at 21:00;  Status DC


Fentanyl Citrate (Fentanyl 2ml Vial) 50 mcg PRN Q3HRS  ONCE IVP ;  Start 8/17/21

at 21:00;  Stop 8/17/21 at 21:00;  Status DC


Sodium Chloride 1,000 ml @  1,000 mls/hr 1X  ONCE IV ;  Start 8/17/21 at 21:00; 

Stop 8/17/21 at 21:00;  Status DC


Sodium Chloride 1,000 ml @  125 mls/hr 1X  ONCE IV ;  Start 8/17/21 at 23:00;  

Stop 8/17/21 at 21:00;  Status DC


Diltiazem HCl (Cardizem Iv Push) 25 mg STK-MED ONCE IVP ;  Start 8/17/21 at 

21:09;  Stop 8/17/21 at 21:09;  Status DC


Sodium Chloride 1,000 ml @  1,000 mls/hr 1X  ONCE IV  Last administered on 

8/17/21at 21:55;  Start 8/17/21 at 21:15;  Stop 8/17/21 at 21:59;  Status DC


Diltiazem HCl 125 mg/Sodium Chloride 125 ml @ 5 mls/hr CONT  PRN IV SEE I/O 

RECORD Last administered on 8/19/21at 05:51;  Start 8/17/21 at 22:00


Diltiazem HCl (Cardizem Iv Push) 20 mg 1X  ONCE IVP  Last administered on 

8/17/21at 21:10;  Start 8/17/21 at 21:00;  Stop 8/17/21 at 21:26;  Status DC


Haloperidol Lactate (Haldol) 5 mg 1X  ONCE IVP  Last administered on 8/17/21at 

21:57;  Start 8/17/21 at 21:00;  Stop 8/17/21 at 21:26;  Status DC


Fentanyl Citrate (Fentanyl 2ml Vial) 50 mcg PRN Q3HRS  PRN IVP PAIN Last 

administered on 8/18/21at 01:37;  Start 8/17/21 at 21:00


Sodium Chloride 1,000 ml @  125 mls/hr 1X  ONCE IV  Last administered on 

8/17/21at 23:00;  Start 8/17/21 at 23:00;  Stop 8/18/21 at 06:59;  Status DC


Piperacillin Sod/ Tazobactam Sod 4.5 gm/Sodium Chloride 50 ml @  100 mls/hr Q6H 

IV  Last administered on 8/19/21at 04:22;  Start 8/17/21 at 22:00


Vancomycin HCl 1 gm/Sodium Chloride 250 ml @  250 mls/hr Q24H IV  Last adminis

tered on 8/18/21at 14:33;  Start 8/18/21 at 13:00


Vancomycin HCl (Vancomycin Trough Level) 1 each 1X  ONCE MC ;  Start 8/19/21 at 

12:30;  Stop 8/19/21 at 12:31


Lactobacillus Rhamnosus (Culturelle) 1 cap BID PO  Last administered on 

8/18/21at 20:34;  Start 8/18/21 at 09:00


Adenosine (Adenocard) 6 mg STK-MED ONCE IV ;  Start 8/18/21 at 10:39;  Stop 

8/18/21 at 10:39;  Status DC


Diltiazem HCl (Cardizem Iv Push) 25 mg STK-MED ONCE IVP ;  Start 8/18/21 at 

10:46;  Stop 8/18/21 at 10:46;  Status DC


Diltiazem HCl (Cardizem Iv Push) 10 mg 1X  ONCE IVP  Last administered on 

8/18/21at 11:01;  Start 8/18/21 at 11:00;  Stop 8/18/21 at 11:08;  Status DC


Adenosine (Adenocard) 12 mg 1X  ONCE IV  Last administered on 8/18/21at 11:00;  

Start 8/18/21 at 11:00;  Stop 8/18/21 at 11:08;  Status DC


Digoxin (Lanoxin) 500 mcg 1X  ONCE IV  Last administered on 8/18/21at 15:38;  

Start 8/18/21 at 15:30;  Stop 8/18/21 at 15:34;  Status DC


Digoxin (Lanoxin) 125 mcg DAILY PO ;  Start 8/19/21 at 09:00


Diltiazem HCl (Cardizem 24hr Cd) 240 mg DAILY PO ;  Start 8/19/21 at 09:00


Acetaminophen (Tylenol) 325 mg STK-MED ONCE PO ;  Start 8/18/21 at 20:04;  Stop 

8/18/21 at 20:04;  Status DC


Acetaminophen (Tylenol) 650 mg PRN Q6HRS  PRN PO MILD PAIN / TEMP > 100.3'F Last

administered on 8/18/21at 20:34;  Start 8/18/21 at 20:15





Active Scripts


Active


Reported


Simvastatin 20 Mg Tablet 1 Tab PO QHS


Toprol Xl (Metoprolol Succinate) 25 Mg Tab.er.24h 1 Tab PO DAILY 30 Days


Hydrocodone-Acetamin 7.5-325 (Hydrocodone/Acetaminophen) 1 Each Tablet 1 Each PO

BID PRN


Gabapentin ** (Gabapentin) 300 Mg Capsule 900 Mg PO BID


Namenda (Memantine Hcl) 10 Mg Tablet 1 Tab PO BID


Omeprazole 10 Mg Capsule.dr 10 Mg PO DAILY


Prozac (Fluoxetine Hcl) 40 Mg Capsule 1 Cap PO DAILY


Cymbalta (Duloxetine Hcl) 60 Mg Capsule.dr 1 Cap PO DAILY





ALLERGIES


Allergies:  


Coded Allergies:  


     oxycodone (Verified  Allergy, Unknown, 8/17/21)





ROS


Review of Systems


14 point ROS conducted with pertinent positives noted above in hPI





PHYSICAL EXAM


General:  Alert, Oriented X3, Cooperative, No acute distress


HEENT:  Atraumatic


Lungs:  Clear to auscultation


Heart:  Regular rate (SR)


Abdomen:  Soft, No tenderness


Extremities:  No edema, Normal pulses


Skin:  No breakdown


Neuro:  Normal speech, Sensation intact


Psych/Mental Status:  Mental status NL, Mood NL


MUSCULOSKELETAL:  Osteoarthritic changes both hands





VITALS


Vital Signs





Vital Signs








  Date Time  Temp Pulse Resp B/P (MAP) Pulse Ox O2 Delivery O2 Flow Rate FiO2


 


8/19/21 08:30  74 17  99 Nasal Cannula 2.0 


 


8/19/21 07:00    139/65 (89)    


 


8/19/21 06:16 97.8       











LABS


LABS





Laboratory Tests








Test


 8/17/21


10:22 8/17/21


10:46 8/17/21


11:49 8/17/21


15:00


 


Glucose (Fingerstick)


 93 mg/dL


(70-99) 


 


 





 


White Blood Count


 


 16.0 x10^3/uL


(4.0-11.0) 


 





 


Red Blood Count


 


 4.44 x10^6/uL


(3.50-5.40) 


 





 


Hemoglobin


 


 13.8 g/dL


(12.0-15.5) 


 





 


Hematocrit


 


 41.1 %


(36.0-47.0) 


 





 


Mean Corpuscular Volume  93 fL ()   


 


Mean Corpuscular Hemoglobin  31 pg (25-35)   


 


Mean Corpuscular Hemoglobin


Concent 


 34 g/dL


(31-37) 


 





 


Red Cell Distribution Width


 


 13.5 %


(11.5-14.5) 


 





 


Platelet Count


 


 141 x10^3/uL


(140-400) 


 





 


Neutrophils (%) (Auto)  91 % (31-73)   


 


Lymphocytes (%) (Auto)  4 % (24-48)   


 


Monocytes (%) (Auto)  4 % (0-9)   


 


Eosinophils (%) (Auto)  0 % (0-3)   


 


Basophils (%) (Auto)  1 % (0-3)   


 


Neutrophils # (Auto)


 


 14.5 x10^3uL


(1.8-7.7) 


 





 


Lymphocytes # (Auto)


 


 0.7 x10^3/uL


(1.0-4.8) 


 





 


Monocytes # (Auto)


 


 0.7 x10^3/uL


(0.0-1.1) 


 





 


Eosinophils # (Auto)


 


 0.1 x10^3/uL


(0.0-0.7) 


 





 


Basophils # (Auto)


 


 0.1 x10^3/uL


(0.0-0.2) 


 





 


Segmented Neutrophils %  76 % (35-66)   


 


Band Neutrophils %  14 % (0-9)   


 


Lymphocytes %  8 % (24-48)   


 


Monocytes %  1 % (0-10)   


 


Eosinophils %  1 % (0-5)   


 


Platelet Estimate


 


 Adequate


(ADEQUATE) 


 





 


Prothrombin Time


 


 9.5 SEC


(9.4-11.4) 


 





 


Prothromb Time International


Ratio 


 1.0 (0.9-1.1) 


 


 





 


Activated Partial


Thromboplast Time 


 23 SEC (23-33) 


 


 





 


Sodium Level


 


 136 mmol/L


(136-145) 


 





 


Potassium Level


 


 4.3 mmol/L


(3.5-5.1) 


 





 


Chloride Level


 


 100 mmol/L


() 


 





 


Carbon Dioxide Level


 


 27 mmol/L


(21-32) 


 





 


Anion Gap  9 (6-14)   


 


Blood Urea Nitrogen


 


 17 mg/dL


(7-20) 


 





 


Creatinine


 


 0.9 mg/dL


(0.6-1.0) 


 





 


Estimated GFR


(Cockcroft-Gault) 


 60.9 


 


 





 


BUN/Creatinine Ratio  19 (6-20)   


 


Glucose Level


 


 87 mg/dL


(70-99) 


 





 


Lactic Acid Level


 


 2.3 mmol/L


(0.4-2.0) 


 





 


Calcium Level


 


 8.2 mg/dL


(8.5-10.1) 


 





 


Total Bilirubin


 


 0.9 mg/dL


(0.2-1.0) 


 





 


Aspartate Amino Transf


(AST/SGOT) 


 30 U/L (15-37) 


 


 





 


Alanine Aminotransferase


(ALT/SGPT) 


 46 U/L (14-59) 


 


 





 


Alkaline Phosphatase


 


 92 U/L


() 


 





 


Ammonia


 


 27 mcmol/L


(11-34) 


 





 


Troponin I Quantitative


 


 < 0.017 ng/mL


(0-0.055) 


 





 


Total Protein


 


 6.9 g/dL


(6.4-8.2) 


 





 


Albumin


 


 3.5 g/dL


(3.4-5.0) 


 





 


Albumin/Globulin Ratio  1.0 (1.0-1.7)   


 


Coronavirus (COVID-19)(PCR)


 


 


 Negative


(NEGATIVE) 





 


SARS-CoV-2 Antigen (Rapid)


 


 


 Negative


(NEGATIVE) 





 


Bedside Venous pH


 


 


 


 7.40


(7.32-7.42)


 


Bedside Venous pCO2


 


 


 


 39 mmHg


(41-51)


 


Bedside Venous pO2


 


 


 


 37 mmHg


(20-40)


 


Venous Blood HCO3


 


 


 


 24 mmol/L


(24-28)


 


POC Venous O2 Saturation


(Elif) 


 


 


 70 % 





 


Bedside FiO2    21 


 


Test


 8/17/21


15:04 8/17/21


22:40 8/18/21


06:03 





 


Lactic Acid Level


 2.3 mmol/L


(0.4-2.0) 


 1.7 mmol/L


(0.4-2.0) 





 


Urine Collection Type  Unknown   


 


Urine Color  Yellow   


 


Urine Clarity  Clear   


 


Urine pH  5.0   


 


Urine Specific Gravity  1.025   


 


Urine Protein


 


 Neg


(NEG-TRACE) 


 





 


Urine Glucose (UA)


 


 Neg mg/dL


(NEG) 


 





 


Urine Ketones (Stick)


 


 Neg mg/dL


(NEG) 


 





 


Urine Blood  Small (NEG)   


 


Urine Nitrite  Neg (NEG)   


 


Urine Bilirubin  Neg (NEG)   


 


Urine Urobilinogen Dipstick


 


 0.2 mg/dL (0.2


mg/dL) 


 





 


Urine Leukocyte Esterase  Neg (NEG)   


 


Urine RBC  Occ /HPF (0-2)   


 


Urine WBC  0 /HPF (0-4)   


 


Urine Squamous Epithelial


Cells 


 Few /LPF 


 


 





 


Urine Amorphous Sediment  Present /HPF   


 


Urine Bacteria  0 /HPF (0-FEW)   


 


White Blood Count


 


 


 13.0 x10^3/uL


(4.0-11.0) 





 


Red Blood Count


 


 


 3.88 x10^6/uL


(3.50-5.40) 





 


Hemoglobin


 


 


 12.0 g/dL


(12.0-15.5) 





 


Hematocrit


 


 


 36.4 %


(36.0-47.0) 





 


Mean Corpuscular Volume   94 fL ()  


 


Mean Corpuscular Hemoglobin   31 pg (25-35)  


 


Mean Corpuscular Hemoglobin


Concent 


 


 33 g/dL


(31-37) 





 


Red Cell Distribution Width


 


 


 13.6 %


(11.5-14.5) 





 


Platelet Count


 


 


 116 x10^3/uL


(140-400) 





 


Neutrophils (%) (Auto)   90 % (31-73)  


 


Lymphocytes (%) (Auto)   5 % (24-48)  


 


Monocytes (%) (Auto)   5 % (0-9)  


 


Eosinophils (%) (Auto)   0 % (0-3)  


 


Basophils (%) (Auto)   0 % (0-3)  


 


Neutrophils # (Auto)


 


 


 11.6 x10^3uL


(1.8-7.7) 





 


Lymphocytes # (Auto)


 


 


 0.7 x10^3/uL


(1.0-4.8) 





 


Monocytes # (Auto)


 


 


 0.6 x10^3/uL


(0.0-1.1) 





 


Eosinophils # (Auto)


 


 


 0.0 x10^3/uL


(0.0-0.7) 





 


Basophils # (Auto)


 


 


 0.0 x10^3/uL


(0.0-0.2) 





 


Sodium Level


 


 


 139 mmol/L


(136-145) 





 


Potassium Level


 


 


 3.3 mmol/L


(3.5-5.1) 





 


Chloride Level


 


 


 103 mmol/L


() 





 


Carbon Dioxide Level


 


 


 24 mmol/L


(21-32) 





 


Anion Gap   12 (6-14)  


 


Blood Urea Nitrogen


 


 


 12 mg/dL


(7-20) 





 


Creatinine


 


 


 1.0 mg/dL


(0.6-1.0) 





 


Estimated GFR


(Cockcroft-Gault) 


 


 53.9 


 





 


BUN/Creatinine Ratio   12 (6-20)  


 


Glucose Level


 


 


 119 mg/dL


(70-99) 





 


Calcium Level


 


 


 7.4 mg/dL


(8.5-10.1) 





 


Total Bilirubin


 


 


 1.1 mg/dL


(0.2-1.0) 





 


Aspartate Amino Transf


(AST/SGOT) 


 


 25 U/L (15-37) 


 





 


Alanine Aminotransferase


(ALT/SGPT) 


 


 33 U/L (14-59) 


 





 


Alkaline Phosphatase


 


 


 77 U/L


() 





 


Total Protein


 


 


 5.9 g/dL


(6.4-8.2) 





 


Albumin


 


 


 2.7 g/dL


(3.4-5.0) 





 


Albumin/Globulin Ratio   0.8 (1.0-1.7)  











ASSESSMENT/PLAN


Assessment/Plan


1.  Leukocytosis, lactic acidosis, fevers, sepsis


2.  Encephaloapthy


3.  AFIB with RVR; new findings. Converted back to SR with Cardizem gtt and IV 

Dig. Presently maintaining SR


4.  Breast CA; s/p 4 weeks of radiation therapy; cellulitis of left breast


5.  Peripheral neuropathy, fibromyalgia


6.  Thrombocytopenia; 





Recommendations





Convert Cardizem to oral; titrate off gtt


Add ASA therapy


Monitor PLTs


TSH


Echo to assess LV systolic function


Ongoing antibiotic therapy


Outpatient event monitor to guide therapy.


Supportive care











ANNALISA PAIZ           Aug 19, 2021 08:51

## 2021-08-20 VITALS — SYSTOLIC BLOOD PRESSURE: 128 MMHG | DIASTOLIC BLOOD PRESSURE: 53 MMHG

## 2021-08-20 VITALS — SYSTOLIC BLOOD PRESSURE: 141 MMHG | DIASTOLIC BLOOD PRESSURE: 71 MMHG

## 2021-08-20 VITALS — DIASTOLIC BLOOD PRESSURE: 72 MMHG | SYSTOLIC BLOOD PRESSURE: 148 MMHG

## 2021-08-20 VITALS
DIASTOLIC BLOOD PRESSURE: 70 MMHG | SYSTOLIC BLOOD PRESSURE: 155 MMHG | SYSTOLIC BLOOD PRESSURE: 155 MMHG | DIASTOLIC BLOOD PRESSURE: 70 MMHG

## 2021-08-20 VITALS — SYSTOLIC BLOOD PRESSURE: 155 MMHG | DIASTOLIC BLOOD PRESSURE: 63 MMHG

## 2021-08-20 VITALS — SYSTOLIC BLOOD PRESSURE: 137 MMHG | DIASTOLIC BLOOD PRESSURE: 59 MMHG

## 2021-08-20 VITALS — DIASTOLIC BLOOD PRESSURE: 60 MMHG | SYSTOLIC BLOOD PRESSURE: 153 MMHG

## 2021-08-20 VITALS — SYSTOLIC BLOOD PRESSURE: 154 MMHG | DIASTOLIC BLOOD PRESSURE: 75 MMHG

## 2021-08-20 LAB
CHOLEST/HDLC SERPL: 3 {RATIO}
HDLC SERPL-MCNC: 53 MG/DL (ref 40–60)
LDLC: 128 MG/DL (ref 0–100)
THYROID STIM HORMONE (TSH): 1.14 UIU/ML (ref 0.36–3.74)
TRIGL SERPL-MCNC: 126 MG/DL (ref 0–150)
VLDLC: 25 MG/DL (ref 0–40)

## 2021-08-20 RX ADMIN — VANCOMYCIN HYDROCHLORIDE SCH MLS/HR: 1 INJECTION, POWDER, LYOPHILIZED, FOR SOLUTION INTRAVENOUS at 00:51

## 2021-08-20 RX ADMIN — AMIODARONE HYDROCHLORIDE PRN MLS/HR: 50 INJECTION, SOLUTION INTRAVENOUS at 05:05

## 2021-08-20 RX ADMIN — Medication SCH CAP: at 08:06

## 2021-08-20 RX ADMIN — PIPERACILLIN SODIUM AND TAZOBACTAM SODIUM SCH MLS/HR: 4; .5 INJECTION, POWDER, LYOPHILIZED, FOR SOLUTION INTRAVENOUS at 03:52

## 2021-08-20 RX ADMIN — ACETAMINOPHEN PRN MG: 325 TABLET, FILM COATED ORAL at 08:07

## 2021-08-20 RX ADMIN — AMIODARONE HYDROCHLORIDE SCH MG: 200 TABLET ORAL at 08:07

## 2021-08-20 RX ADMIN — PIPERACILLIN SODIUM AND TAZOBACTAM SODIUM SCH MLS/HR: 4; .5 INJECTION, POWDER, LYOPHILIZED, FOR SOLUTION INTRAVENOUS at 16:49

## 2021-08-20 RX ADMIN — PANTOPRAZOLE SODIUM SCH MG: 40 TABLET, DELAYED RELEASE ORAL at 08:08

## 2021-08-20 RX ADMIN — VANCOMYCIN HYDROCHLORIDE SCH MLS/HR: 1 INJECTION, POWDER, LYOPHILIZED, FOR SOLUTION INTRAVENOUS at 13:00

## 2021-08-20 RX ADMIN — PIPERACILLIN SODIUM AND TAZOBACTAM SODIUM SCH MLS/HR: 4; .5 INJECTION, POWDER, LYOPHILIZED, FOR SOLUTION INTRAVENOUS at 12:35

## 2021-08-20 RX ADMIN — ACETAMINOPHEN PRN MG: 325 TABLET, FILM COATED ORAL at 14:37

## 2021-08-20 NOTE — PDOC
DATE OF SERVICE:


DOS:


DATE: 8/20/21 


TIME: 21:20





SUBJECTIVE:


Patient seen and examined





OBJECTIVE:


Problems:


Problems


Medical Problems:


(1) Breast cancer


Status: Acute  





(2) Delirium due to medical condition with behavioral disturbance


Status: Acute  





(3) Sepsis due to cellulitis


Status: Acute  





Vital Signs/I&O:





                                   Vital Signs








  Date Time  Temp Pulse Resp B/P (MAP) Pulse Ox O2 Delivery O2 Flow Rate FiO2


 


8/20/21 16:00      Room Air  


 


8/20/21 15:45 99.0 66 12 155/70 (98) 100   


 


8/19/21 08:30       2.0 














                                    I & O   


 


 8/19/21 8/19/21 8/20/21





 14:59 22:59 06:59


 


Intake Total 340 ml 443 ml 959 ml


 


Balance 340 ml 443 ml 959 ml








Physical Exam:


Chest: clear to A and P


CV: RRR


Abdomen:soft





ASSESSMENT:


1.  Sepsis. Resolved on present medications.





2.  Atrial fibrillation.  Converted to sinus.  Continue present medications.  

ECHO with intact LV function. Will call for follow up.  





3.  Breast cancer.  S/P radiation treatment.  Followed  by oncology.





Justification of Admission:


Justification of Admission:


Justification of Admission Dx:  Yes











BETSEY BURR MD            Aug 20, 2021 21:24

## 2021-08-20 NOTE — DS
DATE OF DISCHARGE: 08/20/2021

ATTENDING PHYSICIAN:  Dr. Ace.



FINAL DISCHARGE DIAGNOSES:

1.  Cellulitis and mastitis, left breast.

2.  History of breast cancer with recent radiation therapy.

3.  Supraventricular tachycardia with rapid ventricular rate, controlled.

4.  Underlying depression.

5.  Dementia.

6.  Gastroesophageal reflux disease.

7.  Hyperlipidemia.



HISTORY AND PHYSICAL:  The patient is a 76-year-old female who has breast 

cancer, currently getting radiation therapy.  She has mastitis.  She had altered

mentation.  She was admitted with infection.



PHYSICAL EXAMINATION:  Please see the dictated note.



PERTINENT LABORATORY AND X-RAY STUDIES:  Blood cultures were negative at 72 

hours.  Admission hemoglobin was 13.8 grams, white count 16,000, repeated was 

down to 13,000.  Chemistry panel showed a creatinine of 1 mg percent, potassium 

3.3 mEq.  This will be followed as an outpatient.  Nonfasting blood sugar 109, 

transaminase and liver functions were normal.



COURSE IN THE HOSPITAL:  The patient was admitted.  She was started on empiric 

Zosyn and vancomycin with improvement of her skin infection.  She was calm.  She

was pleasantly confused, but was doing much better.  She did have breakthrough 

episodes of narrow complex tachycardia.  Cardiology consultation was obtained.  

They initially had her on various doses of Cardizem digitalis and eventually 

they added amiodarone. By the fourth hospital day, her heart rate was stable, 

converted to sinus in the 70s-80s per minute.  Therefore, she was ready to go 

home.  I felt this was reasonable.  I contacted her common law .  I 

recommended 7 more days of Augmentin 875 mg p.o. b.i.d., in addition amiodarone 

200 mg b.i.d. for 7 days and then 200 mg p.o. daily thereafter; Cardizem  

p.o. daily.  In addition, she should continue her Cymbalta 60 mg daily, 

Neurontin 900 mg b.i.d., hydrocodone p.r.n. pain, Namenda, omeprazole, and 

Zocor, doses unchanged.  She was discharged then from our hospital in stable 

condition with explicit drug and followup care.



Total discharge time 38 minutes.







DANIELLE

DR: Lee   DD: 08/20/2021 09:05

DT: 08/20/2021 16:04   TID: [id number]

## 2022-01-18 ENCOUNTER — HOSPITAL ENCOUNTER (OUTPATIENT)
Dept: HOSPITAL 63 - DXRAD | Age: 77
End: 2022-01-18
Payer: MEDICARE

## 2022-01-18 DIAGNOSIS — M85.88: ICD-10-CM

## 2022-01-18 DIAGNOSIS — C50.412: Primary | ICD-10-CM

## 2022-01-18 DIAGNOSIS — Z79.811: ICD-10-CM

## 2022-01-18 PROCEDURE — 77080 DXA BONE DENSITY AXIAL: CPT

## 2022-01-18 NOTE — RAD
DXA BONE DENSITY AXIAL



History: Reason: CA / Spl. Instructions:  / History: Postmenopausal



Comparison: None.



TECHNIQUE: Dual energy x-ray absorptiometry of the lumbar spine and right hip was performed. T-score 
of average bone mineral density based was calculated based on standard deviations above or below the 
expected young adult normal value. Diagnostic definitions were established by the [employer]
zation.



FINDINGS: The average bone mineral density associated with L1-L4 is 1.098 g/cm^2, corresponding with 
a T-score of -0.7.



The average total bone mineral density associated with right hip is 0.808 g/cm^2, corresponding with 
a T-score of -1.2.



Refer to the worksheets for full detail.



IMPRESSION:   

1.  Osteopenia. Average bone mineral density yields a T-score between -1.0 and -2.5. Fracture risk is
 increased.



Electronically signed by: Oziel Blunt DO (1/18/2022 1:47 PM) [id number]

## 2022-04-18 ENCOUNTER — HOSPITAL ENCOUNTER (EMERGENCY)
Dept: HOSPITAL 63 - ER | Age: 77
LOS: 1 days | Discharge: HOME | End: 2022-04-19
Payer: MEDICARE

## 2022-04-18 VITALS — BODY MASS INDEX: 21.42 KG/M2 | HEIGHT: 67 IN | WEIGHT: 136.47 LBS
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
## 2022-04-18 DIAGNOSIS — Z88.5: ICD-10-CM

## 2022-04-18 DIAGNOSIS — M19.90: ICD-10-CM

## 2022-04-18 DIAGNOSIS — S80.02XA: ICD-10-CM
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
## 2022-04-18 DIAGNOSIS — F03.90: ICD-10-CM

## 2022-04-18 DIAGNOSIS — S00.83XA: Primary | ICD-10-CM
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
## 2022-04-18 DIAGNOSIS — K21.9: ICD-10-CM

## 2022-04-18 DIAGNOSIS — E78.5: ICD-10-CM

## 2022-04-18 DIAGNOSIS — S80.01XA: ICD-10-CM

## 2022-04-18 PROCEDURE — 82550 ASSAY OF CK (CPK): CPT

## 2022-04-18 PROCEDURE — 85610 PROTHROMBIN TIME: CPT

## 2022-04-18 PROCEDURE — 85025 COMPLETE CBC W/AUTO DIFF WBC: CPT

## 2022-04-18 PROCEDURE — 80048 BASIC METABOLIC PNL TOTAL CA: CPT

## 2022-04-18 PROCEDURE — 71045 X-RAY EXAM CHEST 1 VIEW: CPT

## 2022-04-18 PROCEDURE — 96361 HYDRATE IV INFUSION ADD-ON: CPT

## 2022-04-18 PROCEDURE — 84484 ASSAY OF TROPONIN QUANT: CPT

## 2022-04-18 PROCEDURE — 80076 HEPATIC FUNCTION PANEL: CPT

## 2022-04-18 PROCEDURE — 81001 URINALYSIS AUTO W/SCOPE: CPT

## 2022-04-18 PROCEDURE — 99285 EMERGENCY DEPT VISIT HI MDM: CPT

## 2022-04-18 PROCEDURE — 72125 CT NECK SPINE W/O DYE: CPT

## 2022-04-18 PROCEDURE — 96360 HYDRATION IV INFUSION INIT: CPT

## 2022-04-18 PROCEDURE — 80307 DRUG TEST PRSMV CHEM ANLYZR: CPT

## 2022-04-18 PROCEDURE — 84443 ASSAY THYROID STIM HORMONE: CPT

## 2022-04-18 PROCEDURE — 83735 ASSAY OF MAGNESIUM: CPT

## 2022-04-18 PROCEDURE — 85379 FIBRIN DEGRADATION QUANT: CPT

## 2022-04-18 PROCEDURE — 36415 COLL VENOUS BLD VENIPUNCTURE: CPT

## 2022-04-18 PROCEDURE — 83690 ASSAY OF LIPASE: CPT

## 2022-04-18 PROCEDURE — 85730 THROMBOPLASTIN TIME PARTIAL: CPT

## 2022-04-18 PROCEDURE — 93005 ELECTROCARDIOGRAM TRACING: CPT

## 2022-04-18 PROCEDURE — 70450 CT HEAD/BRAIN W/O DYE: CPT

## 2022-04-18 PROCEDURE — 83880 ASSAY OF NATRIURETIC PEPTIDE: CPT

## 2022-04-18 NOTE — PHYS DOC
Past History


Past Medical History:  Arthritis, Cancer, Dementia


Past Surgical History:  Other


Additional Past Surgical Histo:  left breast


Alcohol Use:  None





General Adult


HPI:


HPI:


"  I fell on this past Saturday...I was bobby LO..  Kirk,,, is a [place] 

terrier,,, I got up and then fell again as is trying to get the door open . the 

door got away from me and I fell hit both my knees and my head... I guess he 

thinks I am little more confused today and wanted me checked out...".. t  Pt.   

"  She has the confusion episodes  .. more tonight.. but now she seems okay... "

( Life partner).





Patient is a 77 year old female who presents with of history fall and head 

injury on Saturday.  Patient had no loss of consciousness but did have a 

contusion and abrasion to right forehead.  Patient also has contusions to both 

knees.  Distal neurovascular seems to be intact.  Is able to do straight leg 

lift.  Range of motion increases pain in both knees.  Patient does have a past 

significant history of dementia, primary carcinoma left breast status post 

radiation therapy and chemo.  Patient has history of GERD, hyperlipidemia, 

cellulitis, supraventricular tachycardia, depression,.  Patient had breast 

surgery at [facility]  By Dr. Ramsey approximately 6 months ago.  Pt. follows 

with Dr. Cordero as primary.





Review of Systems:


Review of Systems:


Constitutional:  Denies fever or chills 


Eyes:  Denies change in visual acuity 


HENT:  Denies nasal congestion or sore throat.  Complains of head injury


Respiratory:  Denies cough or shortness of breath 


Cardiovascular:  Denies chest pain or edema 


GI:  Denies abdominal pain, nausea, vomiting, bloody stools or diarrhea 


: Denies dysuria 


Musculoskeletal:  Complaints of bilateral knee pain


Integument:  Denies rash 


Neurologic:  Denies headache, focal weakness or sensory changes 


Endocrine:  Denies polyuria or polydipsia 


Lymphatic:  Denies swollen glands 


Psychiatric: History of depression, dementia, anxiety





Family History:


Family History:


Noncontributory to presentation





Current Medications:


Current Meds:


See nursing for home meds





Allergies:


Allergies:





Allergies








Coded Allergies Type Severity Reaction Last Updated Verified


 


  oxycodone Allergy Unknown  21 Yes











Physical Exam:


PE:





Constitutional:  No acute distress, non-toxic appearance. []


HENT: Normocephalic, contusion and abrasion right forehead, bilateral external 

ears normal, oropharynx moist, no oral exudates, nose normal. []


Eyes: PERRLA, EOMI, conjunctiva scleritis right nasal sclera, no discharge. [] 


Neck: Normal range of motion, no tenderness, supple, no stridor. [] 


Cardiovascular:Heart rate regular rhythm, no murmur [] PMI to left.  Surgical 

scars left breast.


Lungs & Thorax:  Bilateral breath sounds apex on auscultation [] Scars


Abdomen: Bowel sounds normal, soft, no tenderness, no masses, no pulsatile 

masses. [Mild distention


Skin: Warm, dry, no erythema, no rash.  Poor turgor


Back: No tenderness, no CVA tenderness. [] 


Extremities: Bilateral knee tenderness, range of motion increases pain in 

bilateral knees, crepitation, contusion bilateral knees, arthritic changes no 

cyanosis, no clubbing,  no edema. [] 


Neurologic: Alert and oriented X 3, moves all extremities on request, has distal

 sensory, no focal deficits noted. []


Psychologic: Affect anxious, judgement normal, mood normal. []





EKG:


EKG:


My interpretation EKG shows a sinus rhythm at 63 bpm.  No acute morphology.  

Does have leftward axis and a bundle branch block.  With no findings of acute 

STEMI of contralateral changes.  Time of EKG is 12 minutes []





Radiology/Procedures:


Radiology/Procedures:


[[facility] [address], [place] [address]


                                 [phone]


                                        


                                 IMAGING REPORT





                                     Signed





PATIENT: AMBER SAMS  ACCOUNT: [id number]     MRN#: [id number]


: 1945           LOCATION: ER              AGE: 77


SEX: F                    EXAM DT: 22         ACCESSION#: [id number]


STATUS: REG ER            ORD. PHYSICIAN: SUSANA AGUDELO MD


REASON: fall


PROCEDURE: KNEE BILAT 4V





AP chest x-ray





HISTORY: Fall injury.





Comparison chest x-ray 2021





FINDINGS: Heart size normal. Mediastinal silhouette normal. No pneumothorax, 

pulmonary opacities or pleural effusions. Thin linear atelectasis or scarring 

right lung base new from the prior study. Bones are unremarkable.





IMPRESSION: No acute process.











Bilateral knee x-rays 4 views each





HISTORY: Fall, pain.





FINDINGS: Bilateral knees demonstrate meniscal chondrocalcinosis as well as 

joint capsule calcifications posteriorly. No fracture. No dislocation. No 

arthritic change. No bone lesion.





IMPRESSION: No acute osseous injury.





Electronically signed by: Ajay Biswas MD (2022 2:00 AM) [facility]














DICTATED AND SIGNED BY:     AJAY BISWAS MD


DATE:     22





CC: SUSANA AGUDELO MD; PORFIRIO CORDERO MD ~


[facility] [address], [place] [address] [phone]


                                        


                                 IMAGING REPORT





                                     Signed





PATIENT: AMBER SAMS  ACCOUNT: [id number]     MRN#: [id number]


: 1945           LOCATION: ER              AGE: 77


SEX: F                    EXAM DT: 22         ACCESSION#: [id number]


STATUS: REG ER            ORD. PHYSICIAN: SUSANA AGUDELO MD


REASON: fall


PROCEDURE: PORTABLE CHEST 1V





AP chest x-ray





HISTORY: Fall injury.





Comparison chest x-ray 2021





FINDINGS: Heart size normal. Mediastinal silhouette normal. No pneumothorax, 

pulmonary opacities or pleural effusions. Thin linear atelectasis or scarring r

ight lung base new from the prior study. Bones are unremarkable.





IMPRESSION: No acute process.











Bilateral knee x-rays 4 views each





HISTORY: Fall, pain.





FINDINGS: Bilateral knees demonstrate meniscal chondrocalcinosis as well as 

joint capsule calcifications posteriorly. No fracture. No dislocation. No 

arthritic change. No bone lesion.





IMPRESSION: No acute osseous injury.





Electronically signed by: Ajay Biswas MD (2022 2:00 AM) [facility]














DICTATED AND SIGNED BY:     AJAY BISWAS MD


DATE:     22





CC: SUSANA AGUDELO MD; PORFIRIO CORDERO MD ~


][facility] [address], [place] [address] [phone]


                                        


                                 IMAGING REPORT





                                     Signed





PATIENT: AMBER SAMS  ACCOUNT: [id number]     MRN#: [id number]


: 1945           LOCATION: ER              AGE: 77


SEX: F                    EXAM DT: 22         ACCESSION#: [id number]


STATUS: REG ER            ORD. PHYSICIAN: SUSANA AGUDELO MD


REASON: head trauma ,fall sat. , confusion


PROCEDURE: CT HEAD AND CERVICAL SPINE WO





CT head without contrast. CT cervical spine without contrast





PQRS statement: CT scans at this facility use dose reduction including either 

automated exposure control, iterative reconstructions, and /or weight based 

radiation dosing via mA and kV modification when appropriate to reduce radiation

dose to as low as reasonably achievable.





HISTORY: Fall, head trauma, altered mental status, confusion, pain.











CT head findings: Generalized brain atrophy. Ventriculomegaly may be due to 

brain atrophy or normal pressure hydrocephalus. No obstructive hydrocephalus 

evident. Cerebral white matter hypoattenuation most likely changes of chronic 

microvascular ischemic disease. No intracranial hemorrhage, mass or infarction. 

Orbits, mastoids and bones are unremarkable.





IMPRESSION: No acute intracranial CT abnormality.











CT cervical spine findings: Craniocervical junction is intact. Cervical 

vertebral body height and alignment intact. 2 mm anterolisthesis of C7 on T1. No

fracture of the cervical spine. Multilevel cervical disc height loss and disc 

osteophyte and uncovertebral facet spurring with spinal canal and neural 

foraminal stenoses. Lung apices and paraspinal tissues are unremarkable.





IMPRESSION: No acute osseous injury of the cervical spine. Cervical disc disease

as described above.





Electronically signed by: Ajay Biswas MD (2022 12:48 AM) [facility]














DICTATED AND SIGNED BY:     AJAY BISWAS MD


DATE:     22





CC: SUSANA AGUDELO MD; PORFIRIO CORDERO MD ~





Heart Score:


C/O Chest Pain:  N/A


Risk Factors:


Risk Factors:  DM, Current or recent (<one month) smoker, HTN, HLP, family 

history of CAD, obesity.


Risk Scores:


Score 0 - 3:  2.5% MACE over next 6 weeks - Discharge Home


Score 4 - 6:  20.3% MACE over next 6 weeks - Admit for Clinical Observation


Score 7 - 10:  72.7% MACE over next 6 weeks - Early Invasive Strategies





Course & Med Decision Making:


Course & Med Decision Making


Pertinent Labs and Imaging studies reviewed. (See chart for details)





Patient take Tylenol or Profen for discomfort.  Apply Polysporin to abrasions 4 

times a day until healed.  Consider use of walker.  Follow-up with Dr. 

Gil.  Return if any concern.





Impression:





1.  Trip and fall


2.  Head injury


3.  History of dementia


4.  History of arthritis


5.  Contusions


6.  Abrasions


7.  Hx of Dementia











[]





Dragon Disclaimer:


Dragon Disclaimer:


This electronic medical record was generated, in whole or in part, using a voice

 recognition dictation system.





Departure


Departure:


Referrals:  


PORFIRIO CORDERO MD (PCP)





Dragon Disclaimer


This chart was dictated in whole or in part using Voice Recognition software in 

a busy, high-work load, and often noisy Emergency Department environment.  It 

may contain unintended and wholly unrecognized errors or omissions.





Dragon Disclaimer


This chart was dictated in whole or in part using Voice Recognition software in 

a busy, high-work load, and often noisy Emergency Department environment.  It 

may contain unintended and wholly unrecognized errors or omissions.











SUSANA AGUDELO MD           2022 22:57

## 2022-04-19 VITALS — SYSTOLIC BLOOD PRESSURE: 122 MMHG | DIASTOLIC BLOOD PRESSURE: 88 MMHG

## 2022-04-19 LAB
ALBUMIN SERPL-MCNC: 3.5 G/DL (ref 3.4–5)
ALP SERPL-CCNC: 111 U/L (ref 46–116)
ALT SERPL-CCNC: 74 U/L (ref 14–59)
AMPHETAMINE/METHAMPHETAMINE: (no result)
ANION GAP SERPL CALC-SCNC: 3 MMOL/L (ref 6–14)
APTT PPP: YELLOW S
AST SERPL-CCNC: 46 U/L (ref 15–37)
BACTERIA #/AREA URNS HPF: 0 /HPF
BARBITURATES UR-MCNC: (no result) UG/ML
BASOPHILS # BLD AUTO: 0.1 X10^3/UL (ref 0–0.2)
BASOPHILS NFR BLD: 1 % (ref 0–3)
BENZODIAZ UR-MCNC: (no result) UG/L
BILIRUB DIRECT SERPL-MCNC: 0.1 MG/DL (ref 0–0.2)
BILIRUB SERPL-MCNC: 0.3 MG/DL (ref 0.2–1)
CA-I SERPL ISE-MCNC: 23 MG/DL (ref 7–20)
CALCIUM SERPL-MCNC: 9.1 MG/DL (ref 8.5–10.1)
CANNABINOIDS UR-MCNC: (no result) UG/L
CHLORIDE SERPL-SCNC: 108 MMOL/L (ref 98–107)
CO2 SERPL-SCNC: 30 MMOL/L (ref 21–32)
COCAINE UR-MCNC: (no result) NG/ML
CREAT SERPL-MCNC: 1.2 MG/DL (ref 0.6–1)
EOSINOPHIL NFR BLD: 0.2 X10^3/UL (ref 0–0.7)
EOSINOPHIL NFR BLD: 3 % (ref 0–3)
ERYTHROCYTE [DISTWIDTH] IN BLOOD BY AUTOMATED COUNT: 14.7 % (ref 11.5–14.5)
FIBRINOGEN PPP-MCNC: CLEAR MG/DL
GFR SERPLBLD BASED ON 1.73 SQ M-ARVRAT: 43.6 ML/MIN
GLUCOSE SERPL-MCNC: 118 MG/DL (ref 70–99)
GLUCOSE UR STRIP-MCNC: (no result) MG/DL
HCT VFR BLD CALC: 38.5 % (ref 36–47)
HGB BLD-MCNC: 12.9 G/DL (ref 12–15.5)
LIPASE: 184 U/L (ref 73–393)
LYMPHOCYTES # BLD: 1.7 X10^3/UL (ref 1–4.8)
LYMPHOCYTES NFR BLD AUTO: 26 % (ref 24–48)
MAGNESIUM SERPL-MCNC: 2.2 MG/DL (ref 1.8–2.4)
MCH RBC QN AUTO: 30 PG (ref 25–35)
MCHC RBC AUTO-ENTMCNC: 34 G/DL (ref 31–37)
MCV RBC AUTO: 90 FL (ref 79–100)
METHADONE SERPL-MCNC: (no result) NG/ML
MONO #: 0.5 X10^3/UL (ref 0–1.1)
MONOCYTES NFR BLD: 8 % (ref 0–9)
NEUT #: 4.1 X10^3UL (ref 1.8–7.7)
NEUTROPHILS NFR BLD AUTO: 62 % (ref 31–73)
NITRITE UR QL STRIP: (no result)
OPIATES UR-MCNC: (no result) NG/ML
PCP SERPL-MCNC: (no result) MG/DL
PLATELET # BLD AUTO: 155 X10^3/UL (ref 140–400)
POTASSIUM SERPL-SCNC: 4.1 MMOL/L (ref 3.5–5.1)
PROT SERPL-MCNC: 6.7 G/DL (ref 6.4–8.2)
RBC # BLD AUTO: 4.27 X10^6/UL (ref 3.5–5.4)
RBC #/AREA URNS HPF: 0 /HPF (ref 0–2)
SODIUM SERPL-SCNC: 141 MMOL/L (ref 136–145)
SP GR UR STRIP: >=1.03
SQUAMOUS #/AREA URNS LPF: (no result) /LPF
UROBILINOGEN UR-MCNC: 0.2 MG/DL
WBC # BLD AUTO: 6.6 X10^3/UL (ref 4–11)
WBC #/AREA URNS HPF: (no result) /HPF (ref 0–4)

## 2022-04-19 NOTE — RAD
AP chest x-ray



HISTORY: Fall injury.



Comparison chest x-ray August 17, 2021



FINDINGS: Heart size normal. Mediastinal silhouette normal. No pneumothorax, pulmonary opacities or p
leural effusions. Thin linear atelectasis or scarring right lung base new from the prior study. Bones
 are unremarkable.



IMPRESSION: No acute process.







Bilateral knee x-rays 4 views each



HISTORY: Fall, pain.



FINDINGS: Bilateral knees demonstrate meniscal chondrocalcinosis as well as joint capsule calcificati
ons posteriorly. No fracture. No dislocation. No arthritic change. No bone lesion.



IMPRESSION: No acute osseous injury.



Electronically signed by: Mike Biswas MD (4/19/2022 2:00 AM) [facility]

## 2022-04-19 NOTE — RAD
CT head without contrast. CT cervical spine without contrast



PQRS statement: CT scans at this facility use dose reduction including either automated exposure cont
rol, iterative reconstructions, and /or weight based radiation dosing via mA and kV modification when
 appropriate to reduce radiation dose to as low as reasonably achievable.



HISTORY: Fall, head trauma, altered mental status, confusion, pain.







CT head findings: Generalized brain atrophy. Ventriculomegaly may be due to brain atrophy or normal p
ressure hydrocephalus. No obstructive hydrocephalus evident. Cerebral white matter hypoattenuation mo
st likely changes of chronic microvascular ischemic disease. No intracranial hemorrhage, mass or infa
rction. Orbits, mastoids and bones are unremarkable.



IMPRESSION: No acute intracranial CT abnormality.







CT cervical spine findings: Craniocervical junction is intact. Cervical vertebral body height and ali
gnment intact. 2 mm anterolisthesis of C7 on T1. No fracture of the cervical spine. Multilevel cervic
al disc height loss and disc osteophyte and uncovertebral facet spurring with spinal canal and neural
 foraminal stenoses. Lung apices and paraspinal tissues are unremarkable.



IMPRESSION: No acute osseous injury of the cervical spine. Cervical disc disease as described above.



Electronically signed by: Mike Biswas MD (4/19/2022 12:48 AM) [facility]

## 2022-04-19 NOTE — RAD
AP chest x-ray



HISTORY: Fall injury.



Comparison chest x-ray August 17, 2021



FINDINGS: Heart size normal. Mediastinal silhouette normal. No pneumothorax, pulmonary opacities or p
leural effusions. Thin linear atelectasis or scarring right lung base new from the prior study. Bones
 are unremarkable.



IMPRESSION: No acute process.







Bilateral knee x-rays 4 views each



HISTORY: Fall, pain.



FINDINGS: Bilateral knees demonstrate meniscal chondrocalcinosis as well as joint capsule calcificati
ons posteriorly. No fracture. No dislocation. No arthritic change. No bone lesion.



IMPRESSION: No acute osseous injury.



Electronically signed by: Mike Biswas MD (4/19/2022 2:00 AM) [facility]

## 2022-04-19 NOTE — EKG
[facility] [address], [place] [address]

Test Date:    2022               Test Time:    00:12:41

Pat Name:     AMBER SAMS            Department:   

Patient ID:   [id number]           Room:          

Gender:       F                        Technician:   

:          1945               Requested By: SUSANA AGUDELO

Order Number: [id number]            Reading MD:   Rohith Menezes

                                 Measurements

Intervals                              Axis          

Rate:         63                       P:            51

VT:           160                      QRS:          -6

QRSD:         144                      T:            30

QT:           508                                    

QTc:          524                                    

                           Interpretive Statements

SINUS RHYTHM

LEFTWARD AXIS

RIGHT BUNDLE BRANCH BLOCK

Electronically Signed On 2022 18:15:18 CDT by Rohith Menezes

## 2022-04-20 ENCOUNTER — HOSPITAL ENCOUNTER (OUTPATIENT)
Dept: HOSPITAL 63 - ER | Age: 77
Setting detail: OBSERVATION
LOS: 1 days | Discharge: PSYCHIATRIC OR SUBSTANCE USE FACILITY | End: 2022-04-21
Attending: INTERNAL MEDICINE | Admitting: INTERNAL MEDICINE
Payer: MEDICARE

## 2022-04-20 VITALS — WEIGHT: 130.73 LBS | HEIGHT: 67 IN | BODY MASS INDEX: 20.52 KG/M2

## 2022-04-20 VITALS — SYSTOLIC BLOOD PRESSURE: 173 MMHG | DIASTOLIC BLOOD PRESSURE: 72 MMHG

## 2022-04-20 DIAGNOSIS — R41.0: Primary | ICD-10-CM

## 2022-04-20 DIAGNOSIS — F02.80: ICD-10-CM

## 2022-04-20 DIAGNOSIS — M79.7: ICD-10-CM

## 2022-04-20 DIAGNOSIS — G93.89: ICD-10-CM

## 2022-04-20 DIAGNOSIS — Z20.822: ICD-10-CM

## 2022-04-20 DIAGNOSIS — G30.9: ICD-10-CM

## 2022-04-20 DIAGNOSIS — Z87.442: ICD-10-CM

## 2022-04-20 DIAGNOSIS — Z79.899: ICD-10-CM

## 2022-04-20 DIAGNOSIS — Z98.890: ICD-10-CM

## 2022-04-20 DIAGNOSIS — I10: ICD-10-CM

## 2022-04-20 DIAGNOSIS — F33.0: ICD-10-CM

## 2022-04-20 DIAGNOSIS — N20.0: ICD-10-CM

## 2022-04-20 DIAGNOSIS — E78.5: ICD-10-CM

## 2022-04-20 DIAGNOSIS — R45.4: ICD-10-CM

## 2022-04-20 DIAGNOSIS — R29.6: ICD-10-CM

## 2022-04-20 DIAGNOSIS — Z92.3: ICD-10-CM

## 2022-04-20 DIAGNOSIS — J44.9: ICD-10-CM

## 2022-04-20 LAB
ALBUMIN SERPL-MCNC: 4 G/DL (ref 3.4–5)
ALBUMIN/GLOB SERPL: 1.3 {RATIO} (ref 1–1.7)
ALP SERPL-CCNC: 109 U/L (ref 46–116)
ALT SERPL-CCNC: 79 U/L (ref 14–59)
AMPHETAMINE/METHAMPHETAMINE: (no result)
ANION GAP SERPL CALC-SCNC: 6 MMOL/L (ref 6–14)
APTT PPP: YELLOW S
AST SERPL-CCNC: 53 U/L (ref 15–37)
BACTERIA #/AREA URNS HPF: (no result) /HPF
BARBITURATES UR-MCNC: (no result) UG/ML
BASOPHILS # BLD AUTO: 0.1 X10^3/UL (ref 0–0.2)
BASOPHILS NFR BLD: 1 % (ref 0–3)
BENZODIAZ UR-MCNC: (no result) UG/L
BILIRUB SERPL-MCNC: 0.6 MG/DL (ref 0.2–1)
BUN/CREAT SERPL: 15 (ref 6–20)
CA-I SERPL ISE-MCNC: 17 MG/DL (ref 7–20)
CALCIUM SERPL-MCNC: 9.7 MG/DL (ref 8.5–10.1)
CANNABINOIDS UR-MCNC: (no result) UG/L
CHLORIDE SERPL-SCNC: 103 MMOL/L (ref 98–107)
CO2 SERPL-SCNC: 31 MMOL/L (ref 21–32)
COCAINE UR-MCNC: (no result) NG/ML
CREAT SERPL-MCNC: 1.1 MG/DL (ref 0.6–1)
EOSINOPHIL NFR BLD: 0.1 X10^3/UL (ref 0–0.7)
EOSINOPHIL NFR BLD: 1 % (ref 0–3)
ERYTHROCYTE [DISTWIDTH] IN BLOOD BY AUTOMATED COUNT: 14.6 % (ref 11.5–14.5)
FIBRINOGEN PPP-MCNC: CLEAR MG/DL
GFR SERPLBLD BASED ON 1.73 SQ M-ARVRAT: 48.2 ML/MIN
GLOBULIN SER-MCNC: 3.2 G/DL (ref 2.2–3.8)
GLUCOSE SERPL-MCNC: 120 MG/DL (ref 70–99)
GLUCOSE UR STRIP-MCNC: (no result) MG/DL
HCT VFR BLD CALC: 44 % (ref 36–47)
HGB BLD-MCNC: 14.8 G/DL (ref 12–15.5)
INFLUENZA A PATIENT: NEGATIVE
INFLUENZA B PATIENT: NEGATIVE
LYMPHOCYTES # BLD: 1.5 X10^3/UL (ref 1–4.8)
LYMPHOCYTES NFR BLD AUTO: 22 % (ref 24–48)
MAGNESIUM SERPL-MCNC: 2.1 MG/DL (ref 1.8–2.4)
MCH RBC QN AUTO: 30 PG (ref 25–35)
MCHC RBC AUTO-ENTMCNC: 34 G/DL (ref 31–37)
MCV RBC AUTO: 90 FL (ref 79–100)
METHADONE SERPL-MCNC: (no result) NG/ML
MONO #: 0.6 X10^3/UL (ref 0–1.1)
MONOCYTES NFR BLD: 9 % (ref 0–9)
NEUT #: 4.7 X10^3UL (ref 1.8–7.7)
NEUTROPHILS NFR BLD AUTO: 68 % (ref 31–73)
NITRITE UR QL STRIP: (no result)
OPIATES UR-MCNC: (no result) NG/ML
PCP SERPL-MCNC: (no result) MG/DL
PLATELET # BLD AUTO: 179 X10^3/UL (ref 140–400)
POTASSIUM SERPL-SCNC: 3.8 MMOL/L (ref 3.5–5.1)
PROT SERPL-MCNC: 7.2 G/DL (ref 6.4–8.2)
RBC # BLD AUTO: 4.87 X10^6/UL (ref 3.5–5.4)
RBC #/AREA URNS HPF: (no result) /HPF (ref 0–2)
SODIUM SERPL-SCNC: 140 MMOL/L (ref 136–145)
SP GR UR STRIP: 1.02
SQUAMOUS #/AREA URNS LPF: (no result) /LPF
UROBILINOGEN UR-MCNC: 0.2 MG/DL
WBC # BLD AUTO: 6.9 X10^3/UL (ref 4–11)
WBC #/AREA URNS HPF: (no result) /HPF (ref 0–4)

## 2022-04-20 PROCEDURE — 80307 DRUG TEST PRSMV CHEM ANLYZR: CPT

## 2022-04-20 PROCEDURE — 85610 PROTHROMBIN TIME: CPT

## 2022-04-20 PROCEDURE — 36415 COLL VENOUS BLD VENIPUNCTURE: CPT

## 2022-04-20 PROCEDURE — 72125 CT NECK SPINE W/O DYE: CPT

## 2022-04-20 PROCEDURE — 96374 THER/PROPH/DIAG INJ IV PUSH: CPT

## 2022-04-20 PROCEDURE — G0378 HOSPITAL OBSERVATION PER HR: HCPCS

## 2022-04-20 PROCEDURE — 83690 ASSAY OF LIPASE: CPT

## 2022-04-20 PROCEDURE — 82550 ASSAY OF CK (CPK): CPT

## 2022-04-20 PROCEDURE — 84443 ASSAY THYROID STIM HORMONE: CPT

## 2022-04-20 PROCEDURE — 83735 ASSAY OF MAGNESIUM: CPT

## 2022-04-20 PROCEDURE — 83880 ASSAY OF NATRIURETIC PEPTIDE: CPT

## 2022-04-20 PROCEDURE — 85025 COMPLETE CBC W/AUTO DIFF WBC: CPT

## 2022-04-20 PROCEDURE — 96372 THER/PROPH/DIAG INJ SC/IM: CPT

## 2022-04-20 PROCEDURE — U0003 INFECTIOUS AGENT DETECTION BY NUCLEIC ACID (DNA OR RNA); SEVERE ACUTE RESPIRATORY SYNDROME CORONAVIRUS 2 (SARS-COV-2) (CORONAVIRUS DISEASE [COVID-19]), AMPLIFIED PROBE TECHNIQUE, MAKING USE OF HIGH THROUGHPUT TECHNOLOGIES AS DESCRIBED BY CMS-2020-01-R: HCPCS

## 2022-04-20 PROCEDURE — 71045 X-RAY EXAM CHEST 1 VIEW: CPT

## 2022-04-20 PROCEDURE — G0379 DIRECT REFER HOSPITAL OBSERV: HCPCS

## 2022-04-20 PROCEDURE — 82140 ASSAY OF AMMONIA: CPT

## 2022-04-20 PROCEDURE — 99285 EMERGENCY DEPT VISIT HI MDM: CPT

## 2022-04-20 PROCEDURE — 85379 FIBRIN DEGRADATION QUANT: CPT

## 2022-04-20 PROCEDURE — 80076 HEPATIC FUNCTION PANEL: CPT

## 2022-04-20 PROCEDURE — 81001 URINALYSIS AUTO W/SCOPE: CPT

## 2022-04-20 PROCEDURE — 80053 COMPREHEN METABOLIC PANEL: CPT

## 2022-04-20 PROCEDURE — 85730 THROMBOPLASTIN TIME PARTIAL: CPT

## 2022-04-20 PROCEDURE — 87428 SARSCOV & INF VIR A&B AG IA: CPT

## 2022-04-20 PROCEDURE — 93005 ELECTROCARDIOGRAM TRACING: CPT

## 2022-04-20 PROCEDURE — 80048 BASIC METABOLIC PNL TOTAL CA: CPT

## 2022-04-20 PROCEDURE — 84484 ASSAY OF TROPONIN QUANT: CPT

## 2022-04-20 PROCEDURE — 70450 CT HEAD/BRAIN W/O DYE: CPT

## 2022-04-20 NOTE — EKG
[facility] [address], [place] [address]

Test Date:    2022               Test Time:    16:45:22

Pat Name:     AMBER SAMS            Department:   

Patient ID:   [id number]           Room:          

Gender:       F                        Technician:   SHAYLEE

:          1945               Requested By: MORELIA CARIAS

Order Number: [id number]            Reading MD:   Rohith Menezes

                                 Measurements

Intervals                              Axis          

Rate:         72                       P:            51

MT:           168                      QRS:          -21

QRSD:         122                      T:            26

QT:           458                                    

QTc:          503                                    

                           Interpretive Statements

SINUS RHYTHM

LEFTWARD AXIS

RIGHT BUNDLE BRANCH BLOCK

ABNORMAL ECG

Electronically Signed On 2022 18:01:20 CDT by Rohith Menezes

## 2022-04-20 NOTE — RAD
XR CHEST 1V



History: Reason: ams / Spl. Instructions:  / History: 



Comparison: April 19, 2020



Findings:

Hyperinflation. Diffuse reticular interstitial thickening, similar compared to prior. No consolidatio
n or pleural effusion. No pneumothorax. Glenohumeral DJD.



Impression: 

1.  Hyperinflation with chronic reticular interstitial thickening.



Electronically signed by: Oziel Blunt DO (4/20/2022 5:33 PM) [facility]

## 2022-04-20 NOTE — PHYS DOC
Past History


Past Medical History:  Arthritis, Cancer, Dementia


Additional Past Medical Histor:  Alzheimers


 (MORELIA CARIAS MD)


Past Surgical History:  Cancer Surgery


Additional Past Surgical Histo:  left breast


 (MORELIA CARIAS MD)


Alcohol Use:  Rarely


 (MORELIA CARIAS MD)





General Adult


EDM:


Chief Complaint:  ALTERED MENTAL STATUS





HPI:


HPI:





Patient is a 77-year-old female with history of Alzheimer's.  She presents with 

increasing confusion for the last couple of days.  There is a history of fall 

several days ago.  The patient had a CT at that time which was negative.  She 

has not seemed to return to her normal self though and her confusion is worse to

day.  She has not had a fever.  No complaints of chest pain or trouble breathing

per the patient's significant other.  She has not had any abdominal pain, 

vomiting or urinary symptoms.


 (MORELIA CARIAS MD)





Review of Systems:


Review of Systems:


Review of systems not obtainable secondary to patient's Alzheimer's








 (MORELIA CARIAS MD)





Allergies:


Allergies:





Allergies








Coded Allergies Type Severity Reaction Last Updated Verified


 


  oxycodone Allergy Unknown  8/17/21 Yes








 (MORELIA CARIAS MD)





Physical Exam:


PE:


Constitutional: Well developed, well nourished, no acute distress, non-toxic 

appearance. 


HENT: Patient has a bruise on the right side of her forehead which appears to be

several days old, bilateral external ears normal, mucosa moist, nose normal. 


Eyes:  EOMI, conjunctiva normal, no discharge. 


Neck: Normal range of motion, supple, no stridor, no meningeal signs. 


Cardiovascular: Regular rate and rhythm


Lungs & Thorax:  Bilateral breath sounds clear to auscultation


Abdomen:  Soft, no tenderness or obvious masses


Skin: Warm, dry, no erythema, no rash.  


Extremities: No tenderness, no cyanosis, no clubbing, ROM intact, no edema. 


Neurologic: Alert but somewhat disoriented, normal motor function, normal 

sensory function, no focal deficits noted. 


Psychologic: Patient is agitated and anxious.


 (MORELIA CARIAS MD)





EKG:


EKG:


[]


 (MORELIA CARIAS MD)





Radiology/Procedures:


Radiology/Procedures:


[]


 (MORELIA CARIAS MD)





Heart Score:


Risk Factors:


Risk Factors:  DM, Current or recent (<one month) smoker, HTN, HLP, family 

history of CAD, obesity.


Risk Scores:


Score 0 - 3:  2.5% MACE over next 6 weeks - Discharge Home


Score 4 - 6:  20.3% MACE over next 6 weeks - Admit for Clinical Observation


Score 7 - 10:  72.7% MACE over next 6 weeks - Early Invasive Strategies


 (MORELIA CARIAS MD)


C/O Chest Pain:  No


 (NÉSTOR ROBERTO MD)


Course & Med Decision Making:


Course & Med Decision Making


Pertinent Labs and Imaging studies reviewed. (See chart for details)





[]


 (MORELIA CARIAS MD)


Course & Med Decision Making


Patient care handed off to me at checkout pending labs and imaging.  Patient 

vital signs nonconcerning.  Laboratory analysis not concerning.  Urinalysis not 

concerning.  Imaging not concerning.  Patient unfortunately has Alzheimer's 

disease in the late stages and is having some confusion over the last couple of 

days per .   states he is really worried about her because he is 

having a hard time with her at home as she has been emotional, yelling and 

screaming and she has had some falls.  Denies any recent traumas, travels, 

illnesses, fevers, chest pain, shortness of breath, abdominal pain, nausea, 

vomiting, diarrhea.  Denies any known ill contacts.  States has been taking all 

of her medications as prescribed.  States she has been getting worse though over

 the last couple of months as well.


Discussed findings with  and patient.  Advised admission to [facility] 

for continued evaluation, treatment and observation and discussions about 

admission to the [facility] psych unit and/or long-term care at home as has been is 

having a tough time taking care of her by himself at home and may be unsafe.


 (NÉSTOR ROBERTO MD)


Dragon Disclaimer:


Dragon Disclaimer:


This electronic medical record was generated, in whole or in part, using a voice

 recognition dictation system.


 (MORELIA CARIAS MD)





Departure


Departure:


Impression:  


   Primary Impression:  


   Alzheimer's dementia


   Additional Impression:  


   Irritability and anger


Disposition:  09 ADMITTED AS INPATIENT


Condition:  STABLE


Referrals:  


PORFIRIO FARRELL MD (PCP)











MORELIA CARIAS MD            Apr 20, 2022 16:39


NÉSTOR ROBERTO MD               Apr 20, 2022 19:54

## 2022-04-20 NOTE — RAD
CT HEAD/BRAIN WO



History: Altered mental status



Comparison: 4/18/2022, 8/16/2021



Technique: Noncontrast CT imaging was performed of the head.  



Findings: 

No acute intracranial hemorrhage. There is redemonstrated lateral predominant ventriculomegaly and mi
ld cortical atrophy. No herniation or mass. Hypointense attenuation of the periventricular and deep w
amy matter may represent chronic microvascular ischemic change versus transependymal flow CSF. No ev
idence of acute territorial infarction.



Postsurgical changes of the lenses. The orbits are otherwise unremarkable. Imaged paranasal sinuses a
nd mastoid air cells are clear. The scalp and calvarium are unremarkable.



Impression:

1.  No acute intracranial abnormality.

2.  Chronic lateral ventriculomegaly. Correlate for process such as normal pressure hydrocephalus.





-----

Exposure: One or more of the following individualized dose reduction techniques were utilized for thi
s examination:  

1. Automated exposure control

2. Adjustment of the mA and/or kV according to patient size

3. Use of iterative reconstruction technique.



Electronically signed by: Yehuda Cintron MD (4/20/2022 5:23 PM) [facility]

## 2022-04-21 ENCOUNTER — HOSPITAL ENCOUNTER (INPATIENT)
Dept: HOSPITAL 63 - GEROPSY | Age: 77
LOS: 11 days | Discharge: HOME | DRG: 57 | End: 2022-05-02
Attending: PSYCHIATRY & NEUROLOGY | Admitting: PSYCHIATRY & NEUROLOGY
Payer: MEDICARE

## 2022-04-21 VITALS — SYSTOLIC BLOOD PRESSURE: 157 MMHG | DIASTOLIC BLOOD PRESSURE: 92 MMHG

## 2022-04-21 VITALS — BODY MASS INDEX: 22.04 KG/M2 | WEIGHT: 140.43 LBS | HEIGHT: 67 IN

## 2022-04-21 VITALS — DIASTOLIC BLOOD PRESSURE: 85 MMHG | SYSTOLIC BLOOD PRESSURE: 147 MMHG

## 2022-04-21 VITALS — DIASTOLIC BLOOD PRESSURE: 80 MMHG | SYSTOLIC BLOOD PRESSURE: 129 MMHG

## 2022-04-21 VITALS — SYSTOLIC BLOOD PRESSURE: 124 MMHG | DIASTOLIC BLOOD PRESSURE: 73 MMHG

## 2022-04-21 DIAGNOSIS — F01.51: ICD-10-CM

## 2022-04-21 DIAGNOSIS — S00.81XA: ICD-10-CM

## 2022-04-21 DIAGNOSIS — G30.9: Primary | ICD-10-CM

## 2022-04-21 DIAGNOSIS — G89.29: ICD-10-CM

## 2022-04-21 DIAGNOSIS — I10: ICD-10-CM

## 2022-04-21 DIAGNOSIS — F41.9: ICD-10-CM

## 2022-04-21 DIAGNOSIS — F63.9: ICD-10-CM

## 2022-04-21 DIAGNOSIS — Z88.8: ICD-10-CM

## 2022-04-21 DIAGNOSIS — N20.0: ICD-10-CM
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
## 2022-04-21 DIAGNOSIS — E78.5: ICD-10-CM

## 2022-04-21 DIAGNOSIS — Z82.62: ICD-10-CM

## 2022-04-21 DIAGNOSIS — M79.7: ICD-10-CM

## 2022-04-21 DIAGNOSIS — Z87.442: ICD-10-CM

## 2022-04-21 DIAGNOSIS — J44.9: ICD-10-CM

## 2022-04-21 DIAGNOSIS — Z83.3: ICD-10-CM

## 2022-04-21 DIAGNOSIS — Z80.0: ICD-10-CM

## 2022-04-21 DIAGNOSIS — E87.6: ICD-10-CM

## 2022-04-21 DIAGNOSIS — Z79.899: ICD-10-CM

## 2022-04-21 DIAGNOSIS — F32.9: ICD-10-CM

## 2022-04-21 DIAGNOSIS — F02.81: ICD-10-CM

## 2022-04-21 DIAGNOSIS — Z99.3: ICD-10-CM

## 2022-04-21 LAB
ALBUMIN SERPL-MCNC: 3.7 G/DL (ref 3.4–5)
ALBUMIN/GLOB SERPL: 1.1 {RATIO} (ref 1–1.7)
ALP SERPL-CCNC: 100 U/L (ref 46–116)
ALT SERPL-CCNC: 98 U/L (ref 14–59)
ANION GAP SERPL CALC-SCNC: 6 MMOL/L (ref 6–14)
ANION GAP SERPL CALC-SCNC: 7 MMOL/L (ref 6–14)
AST SERPL-CCNC: 66 U/L (ref 15–37)
BASOPHILS # BLD AUTO: 0.1 X10^3/UL (ref 0–0.2)
BASOPHILS # BLD AUTO: 0.1 X10^3/UL (ref 0–0.2)
BASOPHILS NFR BLD: 1 % (ref 0–3)
BASOPHILS NFR BLD: 1 % (ref 0–3)
BILIRUB SERPL-MCNC: 0.6 MG/DL (ref 0.2–1)
BUN/CREAT SERPL: 17 (ref 6–20)
CA-I SERPL ISE-MCNC: 21 MG/DL (ref 7–20)
CA-I SERPL ISE-MCNC: 25 MG/DL (ref 7–20)
CALCIUM SERPL-MCNC: 9.4 MG/DL (ref 8.5–10.1)
CALCIUM SERPL-MCNC: 9.5 MG/DL (ref 8.5–10.1)
CHLORIDE SERPL-SCNC: 102 MMOL/L (ref 98–107)
CHLORIDE SERPL-SCNC: 105 MMOL/L (ref 98–107)
CO2 SERPL-SCNC: 30 MMOL/L (ref 21–32)
CO2 SERPL-SCNC: 32 MMOL/L (ref 21–32)
CREAT SERPL-MCNC: 1.3 MG/DL (ref 0.6–1)
CREAT SERPL-MCNC: 1.5 MG/DL (ref 0.6–1)
EOSINOPHIL NFR BLD: 0.1 X10^3/UL (ref 0–0.7)
EOSINOPHIL NFR BLD: 0.1 X10^3/UL (ref 0–0.7)
EOSINOPHIL NFR BLD: 2 % (ref 0–3)
EOSINOPHIL NFR BLD: 3 % (ref 0–3)
ERYTHROCYTE [DISTWIDTH] IN BLOOD BY AUTOMATED COUNT: 14.7 % (ref 11.5–14.5)
ERYTHROCYTE [DISTWIDTH] IN BLOOD BY AUTOMATED COUNT: 14.8 % (ref 11.5–14.5)
GFR SERPLBLD BASED ON 1.73 SQ M-ARVRAT: 33.7 ML/MIN
GFR SERPLBLD BASED ON 1.73 SQ M-ARVRAT: 39.7 ML/MIN
GLOBULIN SER-MCNC: 3.4 G/DL (ref 2.2–3.8)
GLUCOSE SERPL-MCNC: 143 MG/DL (ref 70–99)
GLUCOSE SERPL-MCNC: 92 MG/DL (ref 70–99)
HCT VFR BLD CALC: 41.4 % (ref 36–47)
HCT VFR BLD CALC: 41.7 % (ref 36–47)
HGB BLD-MCNC: 13.9 G/DL (ref 12–15.5)
HGB BLD-MCNC: 14.1 G/DL (ref 12–15.5)
LYMPHOCYTES # BLD: 1.4 X10^3/UL (ref 1–4.8)
LYMPHOCYTES # BLD: 1.4 X10^3/UL (ref 1–4.8)
LYMPHOCYTES NFR BLD AUTO: 21 % (ref 24–48)
LYMPHOCYTES NFR BLD AUTO: 26 % (ref 24–48)
MAGNESIUM SERPL-MCNC: 2.1 MG/DL (ref 1.8–2.4)
MCH RBC QN AUTO: 30 PG (ref 25–35)
MCH RBC QN AUTO: 31 PG (ref 25–35)
MCHC RBC AUTO-ENTMCNC: 34 G/DL (ref 31–37)
MCHC RBC AUTO-ENTMCNC: 34 G/DL (ref 31–37)
MCV RBC AUTO: 90 FL (ref 79–100)
MCV RBC AUTO: 91 FL (ref 79–100)
MONO #: 0.6 X10^3/UL (ref 0–1.1)
MONO #: 0.6 X10^3/UL (ref 0–1.1)
MONOCYTES NFR BLD: 10 % (ref 0–9)
MONOCYTES NFR BLD: 9 % (ref 0–9)
NEUT #: 3.4 X10^3UL (ref 1.8–7.7)
NEUT #: 4.7 X10^3UL (ref 1.8–7.7)
NEUTROPHILS NFR BLD AUTO: 61 % (ref 31–73)
NEUTROPHILS NFR BLD AUTO: 68 % (ref 31–73)
PLATELET # BLD AUTO: 160 X10^3/UL (ref 140–400)
PLATELET # BLD AUTO: 176 X10^3/UL (ref 140–400)
POTASSIUM SERPL-SCNC: 3.4 MMOL/L (ref 3.5–5.1)
POTASSIUM SERPL-SCNC: 3.4 MMOL/L (ref 3.5–5.1)
PROT SERPL-MCNC: 7.1 G/DL (ref 6.4–8.2)
RBC # BLD AUTO: 4.55 X10^6/UL (ref 3.5–5.4)
RBC # BLD AUTO: 4.64 X10^6/UL (ref 3.5–5.4)
SODIUM SERPL-SCNC: 139 MMOL/L (ref 136–145)
SODIUM SERPL-SCNC: 143 MMOL/L (ref 136–145)
WBC # BLD AUTO: 5.6 X10^3/UL (ref 4–11)
WBC # BLD AUTO: 7 X10^3/UL (ref 4–11)

## 2022-04-21 PROCEDURE — 82140 ASSAY OF AMMONIA: CPT

## 2022-04-21 PROCEDURE — U0003 INFECTIOUS AGENT DETECTION BY NUCLEIC ACID (DNA OR RNA); SEVERE ACUTE RESPIRATORY SYNDROME CORONAVIRUS 2 (SARS-COV-2) (CORONAVIRUS DISEASE [COVID-19]), AMPLIFIED PROBE TECHNIQUE, MAKING USE OF HIGH THROUGHPUT TECHNOLOGIES AS DESCRIBED BY CMS-2020-01-R: HCPCS

## 2022-04-21 PROCEDURE — 80053 COMPREHEN METABOLIC PANEL: CPT

## 2022-04-21 PROCEDURE — 87428 SARSCOV & INF VIR A&B AG IA: CPT

## 2022-04-21 PROCEDURE — 85730 THROMBOPLASTIN TIME PARTIAL: CPT

## 2022-04-21 PROCEDURE — 96372 THER/PROPH/DIAG INJ SC/IM: CPT

## 2022-04-21 PROCEDURE — G0378 HOSPITAL OBSERVATION PER HR: HCPCS

## 2022-04-21 PROCEDURE — 82550 ASSAY OF CK (CPK): CPT

## 2022-04-21 PROCEDURE — 82306 VITAMIN D 25 HYDROXY: CPT

## 2022-04-21 PROCEDURE — 85025 COMPLETE CBC W/AUTO DIFF WBC: CPT

## 2022-04-21 PROCEDURE — 83735 ASSAY OF MAGNESIUM: CPT

## 2022-04-21 PROCEDURE — 80307 DRUG TEST PRSMV CHEM ANLYZR: CPT

## 2022-04-21 PROCEDURE — 71045 X-RAY EXAM CHEST 1 VIEW: CPT

## 2022-04-21 PROCEDURE — G0379 DIRECT REFER HOSPITAL OBSERV: HCPCS

## 2022-04-21 PROCEDURE — 93005 ELECTROCARDIOGRAM TRACING: CPT

## 2022-04-21 PROCEDURE — 83880 ASSAY OF NATRIURETIC PEPTIDE: CPT

## 2022-04-21 PROCEDURE — 84443 ASSAY THYROID STIM HORMONE: CPT

## 2022-04-21 PROCEDURE — 85610 PROTHROMBIN TIME: CPT

## 2022-04-21 PROCEDURE — 72125 CT NECK SPINE W/O DYE: CPT

## 2022-04-21 PROCEDURE — 36415 COLL VENOUS BLD VENIPUNCTURE: CPT

## 2022-04-21 PROCEDURE — 83550 IRON BINDING TEST: CPT

## 2022-04-21 PROCEDURE — 70450 CT HEAD/BRAIN W/O DYE: CPT

## 2022-04-21 PROCEDURE — 84480 ASSAY TRIIODOTHYRONINE (T3): CPT

## 2022-04-21 PROCEDURE — 85379 FIBRIN DEGRADATION QUANT: CPT

## 2022-04-21 PROCEDURE — 81001 URINALYSIS AUTO W/SCOPE: CPT

## 2022-04-21 PROCEDURE — 80076 HEPATIC FUNCTION PANEL: CPT

## 2022-04-21 PROCEDURE — 84436 ASSAY OF TOTAL THYROXINE: CPT

## 2022-04-21 PROCEDURE — 96374 THER/PROPH/DIAG INJ IV PUSH: CPT

## 2022-04-21 PROCEDURE — 83036 HEMOGLOBIN GLYCOSYLATED A1C: CPT

## 2022-04-21 PROCEDURE — 80048 BASIC METABOLIC PNL TOTAL CA: CPT

## 2022-04-21 PROCEDURE — 84484 ASSAY OF TROPONIN QUANT: CPT

## 2022-04-21 PROCEDURE — 83540 ASSAY OF IRON: CPT

## 2022-04-21 PROCEDURE — 80061 LIPID PANEL: CPT

## 2022-04-21 PROCEDURE — 85027 COMPLETE CBC AUTOMATED: CPT

## 2022-04-21 PROCEDURE — 86592 SYPHILIS TEST NON-TREP QUAL: CPT

## 2022-04-21 PROCEDURE — 83690 ASSAY OF LIPASE: CPT

## 2022-04-21 RX ADMIN — AMIODARONE HYDROCHLORIDE SCH MG: 200 TABLET ORAL at 20:56

## 2022-04-21 RX ADMIN — GABAPENTIN SCH MG: 300 CAPSULE ORAL at 21:00

## 2022-04-21 RX ADMIN — MEMANTINE HYDROCHLORIDE SCH MG: 10 TABLET ORAL at 20:56

## 2022-04-21 RX ADMIN — GABAPENTIN SCH MG: 300 CAPSULE ORAL at 20:56

## 2022-04-21 RX ADMIN — MEMANTINE HYDROCHLORIDE SCH MG: 10 TABLET ORAL at 21:00

## 2022-04-21 RX ADMIN — DONEPEZIL HYDROCHLORIDE SCH MG: 10 TABLET ORAL at 20:55

## 2022-04-21 RX ADMIN — SIMVASTATIN SCH MG: 20 TABLET, FILM COATED ORAL at 20:55

## 2022-04-21 RX ADMIN — AMIODARONE HYDROCHLORIDE SCH MG: 200 TABLET ORAL at 21:00

## 2022-04-21 RX ADMIN — SIMVASTATIN SCH MG: 20 TABLET, FILM COATED ORAL at 21:00

## 2022-04-21 RX ADMIN — DONEPEZIL HYDROCHLORIDE SCH MG: 10 TABLET ORAL at 21:00

## 2022-04-21 NOTE — HP
DATE OF SERVICE: 2022

ADMIT DATE: 2022

HISTORY OF PRESENT ILLNESS:  The patient is a 77-year-old  female 

patient who was brought by her significant other yesterday to the Emergency Room

of [facility] with worsening confusion that has been going on for the 

last couple of days.  She also has history of fall several days ago.  The 

patient had a CT scan at that time, which was negative.  She has not seemed to 

return to her normal self though and her confusion has been worsening.  She has 

had no fever, not any physical complaints.  She was extensively investigated in 

the Emergency Room and has had lab work and imaging studies.  Her lab works were

essentially unremarkable and her CBC and chemistry was essentially normal for 

her age.  Her ammonia was 40.  Urinalysis was essentially unremarkable and toxic

screen was negative, and her coronavirus by rapid antigen testing was negative 

and influenza A and B were negative.  She did have also another CT scan of the 

head, which showed no acute intracranial abnormality and she has chronic lateral

ventriculomegaly, correlate ____, but she has normal pressure hydrocephalus and 

her chest x-ray was basically unremarkable other than hyperinflation with 

chronic reticular interstitial thickening as her significant other was unable to

take care of her given that she is very confused, agitated, restless.  The 

patient was admitted to medical floor with plan to admit her to [facility] for inpatient psychiatric stabilization.  The patient herself 

was adamant that her  is not dead and she does not know the man that 

brought her here. I actually spoke with her primary care physician, Dr. Owen Cordero in [place] and he confirmed that the only person they know is her

significant other and that her  has been dead for a while.



PAST MEDICAL HISTORY:  Significant for hypertension, nephrolithiasis, 

hyperlipidemia.  She also has COPD, atrophic vaginitis and chronic neck pain, 

fibromyalgia.



PAST SURGICAL HISTORY:   Left breast lumpectomy for which she underwent 

stereotactic breast biopsy.  She has received radiation therapy after that but 

no chemotherapy.



ALLERGIES:  SHE IS ALLERGIC TO OXYCODONE.



MEDICATIONS:  She is currently on the following medications:  She is on 

simvastatin 20 mg at bedtime.  She is on metoprolol succinate 25 mg once a day, 

hydrocodone/APAP 7.5/325 one tablet twice a day, gabapentin 300 mg twice a day, 

duloxetine 60 mg daily, fluoxetine 40 mg daily.  She is on Namenda 10 mg twice a

day and omeprazole 10 mg daily.



SOCIAL HISTORY:  She apparently lives with her significant other.  She is a 

retired schoolteacher.  She does not smoke.  She apparently has no children.



FAMILY HISTORY:  Her father  at age of 97, has diabetes mellitus.  Mother 

 at age of 85, has had colon cancer.  Her sister has colon cancer and 

osteoporosis.



PHYSICAL EXAMINATION:

GENERAL:  On arrival to the Emergency Room, she looked well, slightly pale, not 

jaundiced, cyanosed, no lymphadenopathy, no thyromegaly, no jugular venous 

distention.  No lower limb edema.

VITAL SIGNS:  Her heart rate was 82, blood pressure 142/80, temperature 97.8, 

respiratory rate was 16 and oxygen saturation was 96%.

HEAD, EYES, EARS, NOSE, AND THROAT:  Showed normocephalic, atraumatic.

NECK:  Supple.

HEART:  Showed normal first and second heart sounds.  No gallop, rub or murmur.

CHEST:  Clear to auscultation, no crepitation or rhonchi.

ABDOMEN:  Distended, soft, nontender.

NEUROLOGIC:  She is demented, but without any obvious lateralizing sign.  She is

able to ambulate without assistance or assistive devices.



LABORATORY WORK:  On admission showed a white cell count of 6.9, hemoglobin 

14.8, hematocrit 44, MCV 90 and platelet count of 179,000 with manual 

differential shows 68% polymorphs, ____ lymphocytes and 9% monocytes.  Her serum

sodium was 140, potassium 3.8, chloride 103, bicarbonate 31, anion gap of 6, BUN

17, creatinine 1.1.  Estimated GFR was 48 mL per minute, glucose 120, calcium 

was 9.7, magnesium was 2.1.  Total bilirubin, alkaline phosphatase were normal. 

AST, ALT were normal.  Her ammonia was 40.  Troponin I high sensitivity was 9.  

Total protein 7.2.  Albumin was 4.  Urinalysis essentially unremarkable and 

toxic screen was negative and her influenza A and B were negative and 

coronavirus by rapid antigen testing was negative.



ASSESSMENT AND PLAN:  The patient was admitted to [address] [place] [address] for further evaluation

and to consult the psychiatrist.  She was appropriate to be admitted to [facility] for inpatient psychiatric stabilization.







AMM/GAETANO/CARRIE

DR: AMM/nts   DD: 2022 15:31

DT: 2022 16:01   TID: [id number]

## 2022-04-21 NOTE — PDOC
Exam


Note:


Anuj Note:


Please also refer to the separate dictated note~for this date of service 

dictated separately.~Patient seen individually. Discussed the patient with 

Nursing staff reviewed the chart.~Reviewed interim history and current 

functioning. Reviewed vital signs,~Labs/ Radiology~and current medications noted

below. Continue current treatment with the changes noted in the dictated 

addendum note





Assessment:


Vital Signs/I&O:





                                   Vital Signs








  Date Time  Temp Pulse Resp B/P (MAP) Pulse Ox O2 Delivery O2 Flow Rate FiO2


 


4/21/22 20:56  78  129/80    


 


4/21/22 18:47   18   Room Air  











Current Medications:


Meds:





Current Medications








 Medications


  (Trade)  Dose


 Ordered  Sig/Bryant


 Route


 PRN Reason  Start Time


 Stop Time Status Last Admin


Dose Admin


 


 Albuterol Sulfate


  (Ventolin Hfa


 Inhaler)  2 puff  PRN Q4HRS  PRN


 INH


 wheezing  4/21/22 20:30


     





 


 Amiodarone HCl


  (Cordarone)  200 mg  BID


 PO


   4/21/22 21:00


    4/21/22 20:56





 


 Anastrozole


  (Arimidex)  1 mg  DAILY


 PO


   4/22/22 09:00


     





 


 Donepezil HCl


  (Aricept)  10 mg  HS


 PO


   4/21/22 21:00


    4/21/22 20:55





 


 Duloxetine HCl


  (Cymbalta)  60 mg  DAILY


 PO


   4/22/22 09:00


     





 


 Gabapentin


  (Neurontin)  900 mg  BID


 PO


   4/21/22 21:00


    4/21/22 20:56





 


 Acetaminophen/


 Hydrocodone Bitart


  (Lortab 7.5/325)  1 tab  PRN BID  PRN


 PO


 PAIN  4/21/22 20:00


     





 


 Memantine


  (Namenda)  10 mg  BID


 PO


   4/21/22 21:00


    4/21/22 20:56





 


 Simvastatin


  (Zocor)  20 mg  QHS


 PO


   4/21/22 21:00


    4/21/22 20:55





 


 Ascorbic Acid


  (Vitamin C)  250 mg  DAILY


 PO


   4/22/22 09:00


     





 


 Vitamin D


  (Vitamin D3)  1,000 unit  DAILY


 PO


   4/22/22 09:00


     





 


 Fluoxetine HCl


  (PROzac)  40 mg  DAILY


 PO


   4/22/22 09:00


     





 


 Cyanocobalamin


  (Vitamin B-12)  1,000 mcg  DAILY


 PO


   4/22/22 09:00


     





 


 Prednisone


  (Prednisone)  5 mg  DAILY


 PO


   4/22/22 09:00


     





 


 Fluticasone/


 Vilanterol


  (Breo Ellipta


 100-25 Mcg)  1 puff  DAILY


 INH


   4/22/22 09:00


     





 


 Acetaminophen


  (Tylenol)  650 mg  PRN Q6HRS  PRN


 PO


 MILD PAIN / TEMP > 100.3'F  4/21/22 20:30


     





 


 Multi-Ingredient


 Ointment


  (Analgesic Balm)  1 jose  PRN QID  PRN


 TP


 MUSCLE PAIN  4/21/22 20:30


     





 


 Al Hydroxide/Mg


 Hydroxide


  (Mylanta Plus Xs)  15 ml  PRN AFTMEALHC  PRN


 PO


 DYSPEPSIA  4/21/22 20:30


     





 


 Magnesium


 Hydroxide


  (Milk Of


 Magnesia)  2,400 mg  PRN QHS  PRN


 PO


 CONSTIPATION  4/21/22 20:30


     











Current Medications








 Medications


  (Trade)  Dose


 Ordered  Sig/Bryant


 Route


 PRN Reason  Start Time


 Stop Time Status Last Admin


Dose Admin


 


 Amiodarone HCl


  (Cordarone)  200 mg  BID


 PO


   4/21/22 21:00


    4/21/22 20:56





 


 Donepezil HCl


  (Aricept)  10 mg  HS


 PO


   4/21/22 21:00


    4/21/22 20:55





 


 Gabapentin


  (Neurontin)  900 mg  BID


 PO


   4/21/22 21:00


    4/21/22 20:56





 


 Memantine


  (Namenda)  10 mg  BID


 PO


   4/21/22 21:00


    4/21/22 20:56





 


 Simvastatin


  (Zocor)  20 mg  QHS


 PO


   4/21/22 21:00


    4/21/22 20:55











I have reviewed the current psychotropics carefully including drug interactions.

 Risk benefit ratio favors no change other than as noted in my dictated progress

note.





Diagnosis:


Problems:  


(1) Psychotic disorder


(2) Major neurocognitive disorder


(3) Dementia, vascular, with delusions


(4) Dementia, vascular, with depression


(5) Anxiety disorder, unspecified


(6) Impulse control disorder, unspecified











SHELTON MONTOYA MD                 Apr 21, 2022 21:29

## 2022-04-21 NOTE — EKG
[facility] [address], [place] [address]

Test Date:    2022               Test Time:    19:41:07

Pat Name:     AMBER SAMS            Department:   

Patient ID:   [id number]           Room:         [address] [place] [address]

Gender:       F                        Technician:   SHAYLEE

:          1945               Requested By: SHELTON MONTOYA

Order Number: [id number]            Reading MD:     

                                 Measurements

Intervals                              Axis          

Rate:         94                       P:            41

ID:           [id number]                      QRS:          40

QRSD:         78                       T:            36

QT:           372                                    

QTc:          471                                    

                           Interpretive Statements

SINUS RHYTHM

QRS(T) CONTOUR ABNORMALITY

CONSIDER ANTEROLATERAL MYOCARDIAL DAMAGE

POSSIBLY ABNORMAL ECG

RI6.01

No previous ECG available for comparison

## 2022-04-21 NOTE — DS
DATE OF DISCHARGE: 04/21/2022

DISCHARGE/TRANSFER SUMMARY



HOSPITAL COURSE:  The patient is a 77-year-old  female patient who was 

brought by her significant other with worsening confusion and agitation.  She 

has fallen multiple times and the patient's memory has dramatically deteriorated

and he is unable to take care of her.  She is adamant that her  is not 

dead, although he is dead for years now, and she does not recognize her 

significant other that has been living with her for years also.  In any case, 

she was extensively investigated in the Emergency Room and all her lab works, 

chest x-ray, urinalysis, toxic screen, are all within normal range and a 

decision was made to transfer her to [facility] for inpatient 

psychiatric stabilization.



PHYSICAL EXAMINATION:

GENERAL:  When I saw her today, she looked well and was clearly in no apparent 

respiratory distress.

VITAL SIGNS:  Her heart rate was 77, blood pressure was 157/92, temperature was 

98, respiratory rate was 20 and oxygen saturation was 96% on room air.

HEAD, EYES, EARS, NOSE, AND THROAT:  Normocephalic, atraumatic.

NECK:  Supple.

HEART:  Showed normal first and second heart sounds.  No gallop or murmur.

CHEST:  Clear to auscultation, no crepitation or rhonchi.

ABDOMEN:  Distended, soft, nontender.

NEUROLOGIC:  She is demented without any obvious lateralizing sign.



LABORATORY DATA:  This morning showed a white cell count 5.6, hemoglobin 13.9, 

hematocrit 41, MCV 91, and platelet count of 160,000 with normal manual 

differential.  Her chemistry showed a serum sodium 143, potassium 3.4, chloride 

105, bicarbonate 32, anion gap of 6, BUN 21, creatinine 1.3.  Estimated GFR was 

39 mL per minute.  Her glucose was 92 and calcium was 9.5.  Urinalysis 

essentially unremarkable.  Toxic screen was negative and her coronavirus PCR was

negative.  Influenza A and B were negative.  Chest x-ray was clear.  CT scan of 

the head showed ventriculomegaly, but otherwise, no acute intracranial 

abnormality.



DISCHARGE MEDICATIONS:  The patient was transferred to [facility] to

continue on the following medications:  Simvastatin 20 mg at bedtime, metoprolol

succinate 25 mg once a day, hydrocodone/APAP 7.5/325 one tablet twice a day, 

gabapentin 900 mg twice a day, duloxetine 60 mg daily, fluoxetine 40 mg daily, 

Namenda 10 mg twice a day, omeprazole 10 mg once a day.  She is also on 

anastrozole for her breast cancer, Anoro Ellipta 1 puff once a day, amiodarone 

200 mg once a day, Aricept 10 mg once a day.



FINAL DISCHARGE DIAGNOSES:

1.  Worsening dementia.

2.  Chronic obstructive pulmonary disease.

3.  Mild recurrent major depression.

4.  Chronic musculoskeletal disease.

5.  Hypertension.

6.  Fibromyalgia.

7.  Nephrolithiasis.

8.  Hyperlipidemia.







AMM/MUK/GRECIA

DR: AMM/nts   DD: 04/21/2022 15:47

DT: 04/21/2022 20:43   TID: [id number]

## 2022-04-22 VITALS — SYSTOLIC BLOOD PRESSURE: 138 MMHG | DIASTOLIC BLOOD PRESSURE: 74 MMHG

## 2022-04-22 VITALS — SYSTOLIC BLOOD PRESSURE: 107 MMHG | DIASTOLIC BLOOD PRESSURE: 67 MMHG

## 2022-04-22 LAB
ANION GAP SERPL CALC-SCNC: 8 MMOL/L (ref 6–14)
CA-I SERPL ISE-MCNC: 24 MG/DL (ref 7–20)
CALCIUM SERPL-MCNC: 9.7 MG/DL (ref 8.5–10.1)
CHLORIDE SERPL-SCNC: 102 MMOL/L (ref 98–107)
CHOLEST/HDLC SERPL: 3.1 {RATIO}
CO2 SERPL-SCNC: 31 MMOL/L (ref 21–32)
CREAT SERPL-MCNC: 1.4 MG/DL (ref 0.6–1)
GFR SERPLBLD BASED ON 1.73 SQ M-ARVRAT: 36.5 ML/MIN
GLUCOSE SERPL-MCNC: 142 MG/DL (ref 70–99)
HBA1C MFR BLD: 6.7 % (ref 4.8–5.6)
HDLC SERPL-MCNC: 79 MG/DL (ref 40–60)
LDLC: 135 MG/DL (ref 0–100)
POTASSIUM SERPL-SCNC: 3.7 MMOL/L (ref 3.5–5.1)
SODIUM SERPL-SCNC: 141 MMOL/L (ref 136–145)
T3 SERPL-MCNC: 86 NG/DL (ref 71–180)
T4 SERPL-MCNC: 8.3 UG/DL (ref 4.5–12)
THYROID STIM HORMONE (TSH): 5.93 UIU/ML (ref 0.36–3.74)
TRIGL SERPL-MCNC: 144 MG/DL (ref 0–150)
VLDLC: 29 MG/DL (ref 0–40)

## 2022-04-22 RX ADMIN — AMIODARONE HYDROCHLORIDE SCH MG: 200 TABLET ORAL at 09:42

## 2022-04-22 RX ADMIN — OXYCODONE HYDROCHLORIDE AND ACETAMINOPHEN SCH MG: 500 TABLET ORAL at 09:41

## 2022-04-22 RX ADMIN — DONEPEZIL HYDROCHLORIDE SCH MG: 10 TABLET ORAL at 20:49

## 2022-04-22 RX ADMIN — AMIODARONE HYDROCHLORIDE SCH MG: 200 TABLET ORAL at 20:49

## 2022-04-22 RX ADMIN — GABAPENTIN SCH MG: 300 CAPSULE ORAL at 20:49

## 2022-04-22 RX ADMIN — VITAMIN D, TAB 1000IU (100/BT) SCH UNIT: 25 TAB at 09:40

## 2022-04-22 RX ADMIN — GABAPENTIN SCH MG: 300 CAPSULE ORAL at 09:42

## 2022-04-22 RX ADMIN — ACETAMINOPHEN PRN MG: 325 TABLET, FILM COATED ORAL at 22:51

## 2022-04-22 RX ADMIN — GABAPENTIN SCH MG: 300 CAPSULE ORAL at 13:30

## 2022-04-22 RX ADMIN — MEMANTINE HYDROCHLORIDE SCH MG: 10 TABLET ORAL at 20:49

## 2022-04-22 RX ADMIN — DULOXETINE SCH MG: 60 CAPSULE, DELAYED RELEASE ORAL at 09:43

## 2022-04-22 RX ADMIN — SIMVASTATIN SCH MG: 20 TABLET, FILM COATED ORAL at 20:50

## 2022-04-22 RX ADMIN — MEMANTINE HYDROCHLORIDE SCH MG: 10 TABLET ORAL at 09:43

## 2022-04-22 RX ADMIN — FLUTICASONE FUROATE AND VILANTEROL TRIFENATATE SCH PUFF: 100; 25 POWDER RESPIRATORY (INHALATION) at 09:43

## 2022-04-22 RX ADMIN — ALBUTEROL SULFATE PRN PUFF: 90 AEROSOL, METERED RESPIRATORY (INHALATION) at 09:43

## 2022-04-22 RX ADMIN — Medication SCH MCG: at 09:43

## 2022-04-22 RX ADMIN — ANASTROZOLE SCH MG: 1 TABLET, COATED ORAL at 09:43

## 2022-04-22 NOTE — HP
DATE OF SERVICE: 04/21/2022

ADMIT DATE: 04/21/2022

PSYCHIATRIC ADMISSION HISTORY/EVALUATION



This note covers elements not covered in my initial note, 04/20.  I met with the

patient on the evening of 04/20 for this evaluation.  Previously discussed with 

Tracie Nixon, , and reviewed information from [address] [place] [address] 

medical surgical [facility] where the patient was hospitalized from home on account 

of worsening confusion, delusions and psychosis.  We had received a referral 

from the medical surgical floor, , Joleen Quinones RN requesting 

evaluation for psychiatric stabilization.



CHIEF COMPLAINT:  "I live with a man and his mother.  They killed him."



HISTORY OF PRESENT ILLNESS:  Reportedly, the patient lives at home with her 

significant other and the patient's daughter.  Recently, she has appeared more 

confused, paranoid, delusional, not recognizing her significant other and being 

belligerent, cursing, unsafe at home, delusional.  She has been aggressive to 

her significant other, does not recognize him.  Thinks the staff has murdered 

her .  Behaviors have been deemed dangerous, unmanageable resulting in 

this referral, having failed outpatient psychiatric interventions.  The patient 

has a history of worsening psychosis, confusion, delusions as noted above.  She 

has had sleep and appetite changes.  No active suicidal or homicidal ideation.  

She does have a history of mood swings.



PAST PSYCHIATRIC HISTORY:  As above.



MEDICAL HISTORY:  Status post breast surgery for CA breast.



AMBULATION:  Independent.



DIET:  Regular.



CODE STATUS:  Full code.



ALLERGIES:  OXYCODONE.



ACCU-CHEKS:  None.



CURRENT PSYCHOTROPICS:  Aricept 10 mg daily, Neurontin 300 mg b.i.d., Cymbalta 

60 mg a day, fluoxetine 40 mg a day, Namenda 10 mg b.i.d.  She was on Haldol IM 

at [address] [place] [address] and Ativan IM which was stopped.



FAMILY HISTORY:  Noncontributory.



SOCIAL HISTORY:  As noted above.  No physical, sexual, elder abuse history is 

noted.  She is not known to be a perpetrator.  No history of alcohol or drug 

abuse.  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



REVIEW OF SYSTEMS:  No CV, , pulmonary, eye system symptoms on review.



MENTAL STATUS EXAM:  The patient was seen individually on 04/21.  She is alert, 

oriented to herself, situation.  Said she was admitted here earlier this 

afternoon, which is accurate.  She made a couple of guesses and then said the 

year was 2022, month was April, president was President Kaleigh and before him was

President Jesus.  She was able to do 2 steps on serial sevens, 93 and 86 and 

then said the next number was 81.  Speech is coherent.  Abstraction fair.  

Computation impaired.  Language function intact.  Mood and affect remain 

anxious, labile.  No active suicidal or homicidal ideation.



LABORATORY DATA:  Reviewed.



IMPRESSION:  Psychotic disorder, unspecified; major neurocognitive disorder; 

early Alzheimer, vascular with delusion; depression; anxiety disorder, 

unspecified; impulse control disorder, unspecified.



PLAN:  Admit to Geropsychiatry Unit at [facility].  I will see the 

patient daily individually from a psychiatric standpoint, medical followup, Dr. Ames/Dr. Ace.  Continue the patient on her current psychotropics, observe 

baseline and adjust as clinically indicated.  She has some bumps and bruises on 

her forehead.  We will do a CT head if one has not been done on [address].



ESTIMATED LENGTH OF STAY:  10-12 days.



DISPOSITION PLANS:  Possibly back home or more higher level of care depending on

the stabilization here.







TITO/MANFRED/CORRY

DR: TITO/funmilayo   DD: 04/21/2022 22:06

DT: 04/21/2022 22:56   TID: [id number]

## 2022-04-22 NOTE — PDOC
Exam


Note:


Anuj Note:


Please also refer to the separate dictated note~for this date of service 

dictated separately.~Patient seen individually. Discussed the patient with 

Nursing staff reviewed the chart.~Reviewed interim history and current 

functioning. Reviewed vital signs,~Labs/ Radiology~and current medications noted

below. Continue current treatment with the changes noted in the dictated 

addendum note





Assessment:


Vital Signs/I&O:





                                   Vital Signs








  Date Time  Temp Pulse Resp B/P (MAP) Pulse Ox O2 Delivery O2 Flow Rate FiO2


 


4/22/22 20:49  78  138/74    


 


4/22/22 15:27 97.4  20  97 Room Air  














                                    I & O   


 


 4/21/22 4/21/22 4/22/22





 15:00 23:00 07:00


 


Intake Total  360 ml 


 


Balance  360 ml 








Labs:





                                Laboratory Tests








Test


 4/22/22


12:39


 


Sodium Level


 141 mmol/L


(136-145)


 


Potassium Level


 3.7 mmol/L


(3.5-5.1)


 


Chloride Level


 102 mmol/L


()


 


Carbon Dioxide Level


 31 mmol/L


(21-32)


 


Anion Gap 8 (6-14)  


 


Blood Urea Nitrogen


 24 mg/dL


(7-20)  H


 


Creatinine


 1.4 mg/dL


(0.6-1.0)  H


 


Estimated GFR


(Cockcroft-Gault) 36.5  





 


Glucose Level


 142 mg/dL


(70-99)  H


 


Calcium Level


 9.7 mg/dL


(8.5-10.1)


 


Creatine Kinase


 126 U/L


()











Current Medications:


Meds:





Laboratory Tests








Test


 4/22/22


12:39


 


Sodium Level 141 mmol/L 


 


Potassium Level 3.7 mmol/L 


 


Chloride Level 102 mmol/L 


 


Carbon Dioxide Level 31 mmol/L 


 


Anion Gap 8 


 


Blood Urea Nitrogen 24 mg/dL 


 


Creatinine 1.4 mg/dL 


 


Estimated GFR


(Cockcroft-Gault) 36.5 





 


Glucose Level 142 mg/dL 


 


Calcium Level 9.7 mg/dL 


 


Creatine Kinase 126 U/L 








Current Medications








 Medications


  (Trade)  Dose


 Ordered  Sig/Bryant


 Route


 PRN Reason  Start Time


 Stop Time Status Last Admin


Dose Admin


 


 Albuterol Sulfate


  (Ventolin Hfa


 Inhaler)  2 puff  PRN Q4HRS  PRN


 INH


 wheezing  4/21/22 20:30


    4/22/22 09:43





 


 Amiodarone HCl


  (Cordarone)  200 mg  BID


 PO


   4/21/22 21:00


    4/22/22 20:49





 


 Anastrozole


  (Arimidex)  1 mg  DAILY


 PO


   4/22/22 09:00


    4/22/22 09:43





 


 Donepezil HCl


  (Aricept)  10 mg  HS


 PO


   4/21/22 21:00


    4/22/22 20:49





 


 Duloxetine HCl


  (Cymbalta)  60 mg  DAILY


 PO


   4/22/22 09:00


    4/22/22 09:43





 


 Gabapentin


  (Neurontin)  900 mg  BID


 PO


   4/21/22 21:00


 4/22/22 09:27 DC  





 


 Acetaminophen/


 Hydrocodone Bitart


  (Lortab 7.5/325)  1 tab  PRN BID  PRN


 PO


 PAIN  4/21/22 20:00


     





 


 Memantine


  (Namenda)  10 mg  BID


 PO


   4/21/22 21:00


    4/22/22 20:49





 


 Simvastatin


  (Zocor)  20 mg  QHS


 PO


   4/21/22 21:00


    4/22/22 20:50





 


 Ascorbic Acid


  (Vitamin C)  250 mg  DAILY


 PO


   4/22/22 09:00


    4/22/22 09:41





 


 Vitamin D


  (Vitamin D3)  1,000 unit  DAILY


 PO


   4/22/22 09:00


    4/22/22 09:40





 


 Fluoxetine HCl


  (PROzac)  40 mg  DAILY


 PO


   4/22/22 09:00


 4/22/22 18:03 DC 4/22/22 09:50





 


 Cyanocobalamin


  (Vitamin B-12)  1,000 mcg  DAILY


 PO


   4/22/22 09:00


    4/22/22 09:43





 


 Prednisone


  (Prednisone)  5 mg  DAILY


 PO


   4/22/22 09:00


    4/22/22 09:43





 


 Fluticasone/


 Vilanterol


  (Breo Ellipta


 100-25 Mcg)  1 puff  DAILY


 INH


   4/22/22 09:00


    4/22/22 09:43





 


 Acetaminophen


  (Tylenol)  650 mg  PRN Q6HRS  PRN


 PO


 MILD PAIN / TEMP > 100.3'F  4/21/22 20:30


     





 


 Multi-Ingredient


 Ointment


  (Analgesic Balm)  1 jose  PRN QID  PRN


 TP


 MUSCLE PAIN  4/21/22 20:30


     





 


 Al Hydroxide/Mg


 Hydroxide


  (Mylanta Plus Xs)  15 ml  PRN AFTMEALHC  PRN


 PO


 DYSPEPSIA  4/21/22 20:30


     





 


 Magnesium


 Hydroxide


  (Milk Of


 Magnesia)  2,400 mg  PRN QHS  PRN


 PO


 CONSTIPATION  4/21/22 20:30


     





 


 Gabapentin


  (Neurontin)  300 mg  TID


 PO


   4/22/22 09:30


    4/22/22 20:49





 


 Potassium Chloride


  (Klor-Con)  20 meq  TID


 PO


   4/22/22 14:00


 4/22/22 12:28 DC  





 


 Potassium Chloride


  (Klor-Con)  20 meq  DAILY


 PO


   4/22/22 12:30


     











Current Medications








 Medications


  (Trade)  Dose


 Ordered  Sig/Bryant


 Route


 PRN Reason  Start Time


 Stop Time Status Last Admin


Dose Admin


 


 Anastrozole


  (Arimidex)  1 mg  DAILY


 PO


   4/22/22 09:00


    4/22/22 09:43





 


 Duloxetine HCl


  (Cymbalta)  60 mg  DAILY


 PO


   4/22/22 09:00


    4/22/22 09:43





 


 Ascorbic Acid


  (Vitamin C)  250 mg  DAILY


 PO


   4/22/22 09:00


    4/22/22 09:41





 


 Vitamin D


  (Vitamin D3)  1,000 unit  DAILY


 PO


   4/22/22 09:00


    4/22/22 09:40





 


 Fluoxetine HCl


  (PROzac)  40 mg  DAILY


 PO


   4/22/22 09:00


 4/22/22 18:03 DC 4/22/22 09:50





 


 Cyanocobalamin


  (Vitamin B-12)  1,000 mcg  DAILY


 PO


   4/22/22 09:00


    4/22/22 09:43





 


 Prednisone


  (Prednisone)  5 mg  DAILY


 PO


   4/22/22 09:00


    4/22/22 09:43





 


 Fluticasone/


 Vilanterol


  (Breo Ellipta


 100-25 Mcg)  1 puff  DAILY


 INH


   4/22/22 09:00


    4/22/22 09:43





 


 Gabapentin


  (Neurontin)  300 mg  TID


 PO


   4/22/22 09:30


    4/22/22 20:49











I have reviewed the current psychotropics carefully including drug interactions.

 Risk benefit ratio favors no change other than as noted in my dictated progress

note.





Diagnosis:


Problems:  


(1) Psychotic disorder


(2) Impulse control disorder, unspecified


(3) Anxiety disorder, unspecified


(4) Dementia, vascular, with depression


(5) Dementia, vascular, with delusions


(6) Major neurocognitive disorder











SHELTON MONTOYA MD                 Apr 22, 2022 21:47

## 2022-04-23 VITALS — DIASTOLIC BLOOD PRESSURE: 68 MMHG | SYSTOLIC BLOOD PRESSURE: 146 MMHG

## 2022-04-23 VITALS — DIASTOLIC BLOOD PRESSURE: 58 MMHG | SYSTOLIC BLOOD PRESSURE: 100 MMHG

## 2022-04-23 RX ADMIN — GABAPENTIN SCH MG: 300 CAPSULE ORAL at 15:06

## 2022-04-23 RX ADMIN — DULOXETINE SCH MG: 60 CAPSULE, DELAYED RELEASE ORAL at 09:00

## 2022-04-23 RX ADMIN — MAGNESIUM HYDROXIDE PRN MG: 400 SUSPENSION ORAL at 15:07

## 2022-04-23 RX ADMIN — AMIODARONE HYDROCHLORIDE SCH MG: 200 TABLET ORAL at 09:00

## 2022-04-23 RX ADMIN — GABAPENTIN SCH MG: 300 CAPSULE ORAL at 20:42

## 2022-04-23 RX ADMIN — DONEPEZIL HYDROCHLORIDE SCH MG: 10 TABLET ORAL at 20:42

## 2022-04-23 RX ADMIN — GABAPENTIN SCH MG: 300 CAPSULE ORAL at 09:00

## 2022-04-23 RX ADMIN — VITAMIN D, TAB 1000IU (100/BT) SCH UNIT: 25 TAB at 09:00

## 2022-04-23 RX ADMIN — ANASTROZOLE SCH MG: 1 TABLET, COATED ORAL at 09:00

## 2022-04-23 RX ADMIN — OXYCODONE HYDROCHLORIDE AND ACETAMINOPHEN SCH MG: 500 TABLET ORAL at 09:00

## 2022-04-23 RX ADMIN — SIMVASTATIN SCH MG: 20 TABLET, FILM COATED ORAL at 20:42

## 2022-04-23 RX ADMIN — Medication SCH MCG: at 09:00

## 2022-04-23 RX ADMIN — FLUTICASONE FUROATE AND VILANTEROL TRIFENATATE SCH PUFF: 100; 25 POWDER RESPIRATORY (INHALATION) at 09:00

## 2022-04-23 RX ADMIN — MEMANTINE HYDROCHLORIDE SCH MG: 10 TABLET ORAL at 09:00

## 2022-04-23 RX ADMIN — AMIODARONE HYDROCHLORIDE SCH MG: 200 TABLET ORAL at 20:41

## 2022-04-23 RX ADMIN — MEMANTINE HYDROCHLORIDE SCH MG: 10 TABLET ORAL at 20:42

## 2022-04-23 NOTE — PDOC
Exam


Note:


Anuj Note:


This note is a late entry for 04/22/2022 covers elements not covered in my 

initial note.





Subjective:  The patient was reviewed at treatment team meeting individually in 

the morning on 04/22/2022 with Shila Duran, Tracie Pedersen, and Evelin Graves (), Juanita, activity therapy, and Fronica LPN, 

discussed and reviewed the chart.   The patient slept 5-1/2 hours previous 

night.  The patients significant other Ed joined the lengthy treatment team 

meeting.  Per nursing report the patient knew this morning that she was at [facility].  She had forehead abrasion from a fall at home.  Ed described at great 

length that she has been more forgetful for the past many months but for the 

past one week it has been much worse and she has had visits to the ER, extensive

workup including CT head was negative.  She has fairly significant education in 

her past including three Masters level courses.  She was seen by Dr. Medina, 

neurologist diagnosed with early dementia in the past reportedly and we will get

those records from [facility].





Review of Systems:  No CV, , pulmonary, eye, ENT system symptoms on review.





Mental Status Exam:  Patient is oriented to herself and situation.  Speech 

coherent, has some latency.  Abstraction fair.  Computation impaired.  Language 

function intact.  Attention span short.  Mood and affect somewhat withdrawn.





Laboratory Data:  Reviewed.





Impression:  Psychotic disorder unspecified.  Major neurocognitive disorder, 

Alzheimer, vascular with delusion, depression.  Anxiety disorder unspecified.  

Impulse control disorder unspecified.





Plan:  Reviewed history from Ed indicated that there were times when the patient

was quite well oriented and other times she would be quite delusional, confused.

 She is on a combination of Duloxetine and Fluoxetine and we will stop the 

Fluoxetine for now to avoid serotonergic syndrome.  Maintain Aricept 10mg a day,

gabapentin 300 mg b.i.d., Cymbalta 60 mg a day, Namenda 10 mg b.i.d.  Consider 

adding low dose Seroquel but we will observe another day and then decide.  

Reviewed drug interactions, risk-benefit ratio.





Assessment:


Vital Signs/I&O:





                                   Vital Signs








  Date Time  Temp Pulse Resp B/P (MAP) Pulse Ox O2 Delivery O2 Flow Rate FiO2


 


4/23/22 05:23 97.2 66 16 100/58 (72) 95   


 


4/22/22 15:27      Room Air  














                                    I & O   


 


 4/22/22 4/22/22 4/23/22





 15:00 23:00 07:00


 


Intake Total 480 ml 360 ml 


 


Balance 480 ml 360 ml 








Labs:





                                Laboratory Tests








Test


 4/22/22


12:39


 


Sodium Level


 141 mmol/L


(136-145)


 


Potassium Level


 3.7 mmol/L


(3.5-5.1)


 


Chloride Level


 102 mmol/L


()


 


Carbon Dioxide Level


 31 mmol/L


(21-32)


 


Anion Gap 8 (6-14)  


 


Blood Urea Nitrogen


 24 mg/dL


(7-20)  H


 


Creatinine


 1.4 mg/dL


(0.6-1.0)  H


 


Estimated GFR


(Cockcroft-Gault) 36.5  





 


Glucose Level


 142 mg/dL


(70-99)  H


 


Calcium Level


 9.7 mg/dL


(8.5-10.1)


 


Creatine Kinase


 126 U/L


()











Current Medications:


Meds:





Current Medications








 Medications


  (Trade)  Dose


 Ordered  Sig/Bryant


 Route


 PRN Reason  Start Time


 Stop Time Status Last Admin


Dose Admin


 


 Anastrozole


  (Arimidex)  1 mg  DAILY


 PO


   4/22/22 09:00


    4/22/22 09:43





 


 Duloxetine HCl


  (Cymbalta)  60 mg  DAILY


 PO


   4/22/22 09:00


    4/22/22 09:43





 


 Ascorbic Acid


  (Vitamin C)  250 mg  DAILY


 PO


   4/22/22 09:00


    4/22/22 09:41





 


 Vitamin D


  (Vitamin D3)  1,000 unit  DAILY


 PO


   4/22/22 09:00


    4/22/22 09:40





 


 Fluoxetine HCl


  (PROzac)  40 mg  DAILY


 PO


   4/22/22 09:00


 4/22/22 18:03 DC 4/22/22 09:50





 


 Cyanocobalamin


  (Vitamin B-12)  1,000 mcg  DAILY


 PO


   4/22/22 09:00


    4/22/22 09:43





 


 Prednisone


  (Prednisone)  5 mg  DAILY


 PO


   4/22/22 09:00


    4/22/22 09:43





 


 Fluticasone/


 Vilanterol


  (Breo Ellipta


 100-25 Mcg)  1 puff  DAILY


 INH


   4/22/22 09:00


    4/22/22 09:43





 


 Gabapentin


  (Neurontin)  300 mg  TID


 PO


   4/22/22 09:30


    4/22/22 20:49











I have reviewed the current psychotropics carefully including drug interactions.

 Risk benefit ratio favors no change other than as noted in my dictated progress

note.





Diagnosis:


Problems:  


(1) Psychotic disorder


(2) Impulse control disorder, unspecified


(3) Anxiety disorder, unspecified


(4) Dementia, vascular, with depression


(5) Dementia, vascular, with delusions


(6) Major neurocognitive disorder


(7) Dementia in Alzheimer's disease with delusions


(8) Dementia in Alzheimer's disease with depression











SHELTON MONTOYA MD                 Apr 23, 2022 08:25

## 2022-04-23 NOTE — PDOC
Exam


Note:


Anuj Note:


Please also refer to the separate dictated note~for this date of service 

dictated separately.~Patient seen individually. Discussed the patient with 

Nursing staff reviewed the chart.~Reviewed interim history and current 

functioning. Reviewed vital signs,~Labs/ Radiology~and current medications noted

below. Continue current treatment with the changes noted in the dictated 

addendum note





Assessment:


Vital Signs/I&O:





                                   Vital Signs








  Date Time  Temp Pulse Resp B/P (MAP) Pulse Ox O2 Delivery O2 Flow Rate FiO2


 


4/23/22 20:41  69  146/68    


 


4/23/22 16:00 97.0  18  97   


 


4/22/22 15:27      Room Air  














                                    I & O   


 


 4/22/22 4/22/22 4/23/22





 14:59 22:59 06:59


 


Intake Total 480 ml 360 ml 


 


Balance 480 ml 360 ml 











Current Medications:


Meds:





Current Medications








 Medications


  (Trade)  Dose


 Ordered  Sig/Bryant


 Route


 PRN Reason  Start Time


 Stop Time Status Last Admin


Dose Admin


 


 Albuterol Sulfate


  (Ventolin Hfa


 Inhaler)  2 puff  PRN Q4HRS  PRN


 INH


 wheezing  4/21/22 20:30


    4/22/22 09:43





 


 Amiodarone HCl


  (Cordarone)  200 mg  BID


 PO


   4/21/22 21:00


    4/23/22 20:41





 


 Anastrozole


  (Arimidex)  1 mg  DAILY


 PO


   4/22/22 09:00


    4/23/22 09:00





 


 Donepezil HCl


  (Aricept)  10 mg  HS


 PO


   4/21/22 21:00


    4/23/22 20:42





 


 Duloxetine HCl


  (Cymbalta)  60 mg  DAILY


 PO


   4/22/22 09:00


    4/23/22 09:00





 


 Gabapentin


  (Neurontin)  900 mg  BID


 PO


   4/21/22 21:00


 4/22/22 09:27 DC  





 


 Acetaminophen/


 Hydrocodone Bitart


  (Lortab 7.5/325)  1 tab  PRN BID  PRN


 PO


 PAIN  4/21/22 20:00


     





 


 Memantine


  (Namenda)  10 mg  BID


 PO


   4/21/22 21:00


    4/23/22 20:42





 


 Simvastatin


  (Zocor)  20 mg  QHS


 PO


   4/21/22 21:00


    4/23/22 20:42





 


 Ascorbic Acid


  (Vitamin C)  250 mg  DAILY


 PO


   4/22/22 09:00


    4/23/22 09:00





 


 Vitamin D


  (Vitamin D3)  1,000 unit  DAILY


 PO


   4/22/22 09:00


    4/23/22 09:00





 


 Fluoxetine HCl


  (PROzac)  40 mg  DAILY


 PO


   4/22/22 09:00


 4/22/22 18:03 DC 4/22/22 09:50





 


 Cyanocobalamin


  (Vitamin B-12)  1,000 mcg  DAILY


 PO


   4/22/22 09:00


    4/23/22 09:00





 


 Prednisone


  (Prednisone)  5 mg  DAILY


 PO


   4/22/22 09:00


    4/23/22 09:00





 


 Fluticasone/


 Vilanterol


  (Breo Ellipta


 100-25 Mcg)  1 puff  DAILY


 INH


   4/22/22 09:00


    4/23/22 09:00





 


 Acetaminophen


  (Tylenol)  650 mg  PRN Q6HRS  PRN


 PO


 MILD PAIN / TEMP > 100.3'F  4/21/22 20:30


    4/22/22 22:51





 


 Multi-Ingredient


 Ointment


  (Analgesic Balm)  1 jose  PRN QID  PRN


 TP


 MUSCLE PAIN  4/21/22 20:30


     





 


 Al Hydroxide/Mg


 Hydroxide


  (Mylanta Plus Xs)  15 ml  PRN AFTMEALHC  PRN


 PO


 DYSPEPSIA  4/21/22 20:30


     





 


 Magnesium


 Hydroxide


  (Milk Of


 Magnesia)  2,400 mg  PRN QHS  PRN


 PO


 CONSTIPATION  4/21/22 20:30


    4/23/22 15:07





 


 Gabapentin


  (Neurontin)  300 mg  TID


 PO


   4/22/22 09:30


    4/23/22 20:42





 


 Potassium Chloride


  (Klor-Con)  20 meq  TID


 PO


   4/22/22 14:00


 4/22/22 12:28 DC  





 


 Potassium Chloride


  (Klor-Con)  20 meq  DAILY


 PO


   4/22/22 12:30


 4/23/22 15:19 DC  











I have reviewed the current psychotropics carefully including drug interactions.

 Risk benefit ratio favors no change other than as noted in my dictated progress

note.





Diagnosis:


Problems:  


(1) Psychotic disorder


(2) Impulse control disorder, unspecified


(3) Anxiety disorder, unspecified


(4) Dementia, vascular, with depression


(5) Dementia, vascular, with delusions


(6) Major neurocognitive disorder


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


(8) Dementia in Alzheimer's disease with delusions











SHELTON MONTOYA MD                 Apr 23, 2022 22:05

## 2022-04-23 NOTE — CONS
DATE OF CONSULTATION: 2022

REASON FOR CONSULTATION:  Medical management.



HISTORY OF PRESENT ILLNESS:  The patient is a 77-year-old  female 

patient who was originally admitted to [address] [place] [address] for medical stabilization on 

account of worsening confusion, delusion, and psychosis.  Apparently, the 

patient lives at home with her significant other and apparently recently seemed 

to be more confused, paranoid, delusional, not recognizing her significant other

and being belligerent, cursing, unsafe at home, delusional.  She has been 

aggressive to her significant other, does not recognize him.  In fact, 

yesterday, she was adamant that her  is still alive and that her 

significant other is a crook that stole all her stuff.  She was extensively 

investigated, and all her lab work and imaging studies were all within normal 

range, and therefore, a decision was made to [facility].  In fact, she seemed to be more 

aware or recognized that her  has  about 7 years ago and that she 

lives with this man and she is actually very thankful today to him for what he 

does to her. She denied any medical problems; however, I did speak with Dr. Cordero, her primary care physician in [place].



PAST MEDICAL HISTORY:  She does have the list of multiple medical problems 

including chronic obstructive pulmonary disease, hypertension, nephrolithiasis, 

hyperlipidemia, chronic back pain, atrophic vaginitis and fibromyalgia.



PAST SURGICAL HISTORY:  Significant for left breast lumpectomy for which she 

underwent a stereotactic breast biopsy and was treated with 16 sessions of 

radiotherapy.  She continued to be on anastrozole, Arimidex 1 mg tablet once a 

day.



ALLERGIES:  She is allergic to OXYCODONE.



MEDICATIONS:  She is currently on the following medications:  She is on 

gabapentin 300 mg 3 times a day, Breo Ellipta 1 puff once a day, prednisone 5 mg

once a day, cyanocobalamin 1000 mcg once a day, fluoxetine 40 mg daily, vitamin 

D 1000 international units once a day, ascorbic acid 250 mg once a day, 

duloxetine 60 mg daily, anastrozole 1 mg daily, simvastatin 20 mg at bedtime, 

Namenda 10 mg twice a day, Aricept 10 mg at bedtime, amiodarone 200 mg twice a 

day, magnesium hydroxide 30 mL p.o. daily p.r.n. for constipation, Mylanta 15 mL

after meals and as needed, acetaminophen 650 mg every 4 hours as well as 

hydrocodone/APAP 7.5/325 one tablet twice a day.



REVIEW OF SYSTEMS:  As per history of present illness.



FAMILY HISTORY:  Her father  at age of 97, had diabetes mellitus.  Mother 

 at age of 85, had colon cancer.  Her sister has colon cancer and 

osteoporosis.



SOCIAL HISTORY:  She apparently  about 7 years ago.  She lives with her 

significant other.  She is a retired schoolteacher.  She does not have children 

of her own.  She does not smoke, drink alcohol or recreational drugs.



PHYSICAL EXAMINATION:

GENERAL:  When I examined her today, she was sitting comfortably on the edge of 

the bed, in no apparent distress.  She is pale, but not jaundiced, cyanosed or 

thyromegaly.  No jugular venous distention.  No limb edema.

VITAL SIGNS:  Her heart rate was 77, blood pressure was 107/67, temperature 97, 

respiratory rate 20, and oxygen saturation was 96%.

HEAD, EYES, EARS, NOSE, AND THROAT:  Normocephalic, atraumatic.

NECK:  Supple.

HEART:  Normal first and second heart sounds.  No gallop, rub or murmur.

CHEST:  Shows central trachea, equal bilateral expansion, air entry, vesicular 

breath sounds.  No crepitation or rhonchi.

ABDOMEN:  Distended, soft, nontender.

NEUROLOGIC:  She is demented without any obvious lateralizing sign.  All her 

cranial nerves are intact.  She moves extremities without difficulty.  She 

ambulates without assistance or assistive devices.



LABORATORY DATA:  Showed a white cell count 7000, hemoglobin 14, hematocrit 42, 

MCV 90 and platelet count of 176,000.  Her chemistry showed a serum sodium 139, 

potassium 3.4, chloride 102, bicarbonate 30, anion gap of 7, BUN 25, creatinine 

1.5.  Estimated GFR was 53 mL per minute.  Her glucose 143, calcium was 9.4, 

magnesium was 2.1.  Total bilirubin and alkaline phosphatase were normal.  AST, 

ALT slightly elevated.  Her total protein 7.1, albumin was 3.7.  Urinalysis was 

essentially unremarkable.  Toxic screen was negative.  Her coronavirus by PCR 

was negative, and her influenza A and B were negative.



ASSESSMENT AND PLAN:  In summary, this is a 77-year-old  female patient

who was admitted on account of being more confused, paranoid, delusional, not 

recognizing her significant other and being very belligerent, cursing and 

aggressive.  She has a multitude of medical problems including COPD, 

hypertension, hyperlipidemia, nephrolithiasis, chronic back pain and neck pain 

as well as fibromyalgia.  Her lab work seemed to be all within acceptable range 

except hypokalemia, for which I started her on potassium supplement.  However, 

her kidney function for some reason is steadily worsening.  In fact, her 

creatinine has risen from 1.1 to 1.5 over the last 24 hours and her BUN also has

risen from 17 to 25. She is not on any nephrotoxic medication neither she on any

diuretics.  I will probably cut down the potassium only to maybe once a day and 

arrange for her to scan her bladder to make sure that she is not retaining 

urine.



Thank you, Dr. Benavidez, for allowing me to participate in the care of this 

patient.







AMM/RAN

DR: AMM/nts   DD: 2022 12:26

DT: 2022 01:23   TID: [id number]

## 2022-04-24 VITALS — DIASTOLIC BLOOD PRESSURE: 81 MMHG | SYSTOLIC BLOOD PRESSURE: 157 MMHG

## 2022-04-24 VITALS — DIASTOLIC BLOOD PRESSURE: 71 MMHG | SYSTOLIC BLOOD PRESSURE: 128 MMHG

## 2022-04-24 RX ADMIN — FLUTICASONE FUROATE AND VILANTEROL TRIFENATATE SCH PUFF: 100; 25 POWDER RESPIRATORY (INHALATION) at 09:00

## 2022-04-24 RX ADMIN — ANASTROZOLE SCH MG: 1 TABLET, COATED ORAL at 08:09

## 2022-04-24 RX ADMIN — SIMVASTATIN SCH MG: 20 TABLET, FILM COATED ORAL at 21:25

## 2022-04-24 RX ADMIN — DONEPEZIL HYDROCHLORIDE SCH MG: 10 TABLET ORAL at 21:26

## 2022-04-24 RX ADMIN — GABAPENTIN SCH MG: 300 CAPSULE ORAL at 21:25

## 2022-04-24 RX ADMIN — AMIODARONE HYDROCHLORIDE SCH MG: 200 TABLET ORAL at 08:08

## 2022-04-24 RX ADMIN — OXYCODONE HYDROCHLORIDE AND ACETAMINOPHEN SCH MG: 500 TABLET ORAL at 08:07

## 2022-04-24 RX ADMIN — DULOXETINE SCH MG: 60 CAPSULE, DELAYED RELEASE ORAL at 08:07

## 2022-04-24 RX ADMIN — GABAPENTIN SCH MG: 300 CAPSULE ORAL at 08:07

## 2022-04-24 RX ADMIN — MEMANTINE HYDROCHLORIDE SCH MG: 10 TABLET ORAL at 08:08

## 2022-04-24 RX ADMIN — AMIODARONE HYDROCHLORIDE SCH MG: 200 TABLET ORAL at 21:26

## 2022-04-24 RX ADMIN — Medication SCH MCG: at 08:08

## 2022-04-24 RX ADMIN — MEMANTINE HYDROCHLORIDE SCH MG: 10 TABLET ORAL at 21:25

## 2022-04-24 RX ADMIN — GABAPENTIN SCH MG: 300 CAPSULE ORAL at 12:56

## 2022-04-24 RX ADMIN — VITAMIN D, TAB 1000IU (100/BT) SCH UNIT: 25 TAB at 08:08

## 2022-04-24 RX ADMIN — QUETIAPINE FUMARATE SCH MG: 25 TABLET, FILM COATED ORAL at 21:27

## 2022-04-24 NOTE — PDOC
Exam


Note:


Anuj Note:


Please also refer to the separate dictated note~for this date of service 

dictated separately.~Patient seen individually. Discussed the patient with 

Nursing staff reviewed the chart.~Reviewed interim history and current 

functioning. Reviewed vital signs,~Labs/ Radiology~and current medications noted

below. Continue current treatment with the changes noted in the dictated 

addendum note





Assessment:


Vital Signs/I&O:





                                   Vital Signs








  Date Time  Temp Pulse Resp B/P (MAP) Pulse Ox O2 Delivery O2 Flow Rate FiO2


 


4/24/22 21:26  76  128/71    


 


4/24/22 15:56 97.5  19  94   


 


4/24/22 06:44      Room Air  














                                    I & O   


 


 4/23/22 4/23/22 4/24/22





 15:00 23:00 07:00


 


Intake Total 500 ml 600 ml 


 


Balance 500 ml 600 ml 








Labs:





                                Laboratory Tests








Test


 4/24/22


06:00


 


SARS-CoV-2 (PCR)


 Not detected


(NOT DETECTD)











Current Medications:


Meds:





Laboratory Tests








Test


 4/24/22


06:00


 


Coronavirus (COVID-19)(PCR) Not detected 








Current Medications








 Medications


  (Trade)  Dose


 Ordered  Sig/Bryant


 Route


 PRN Reason  Start Time


 Stop Time Status Last Admin


Dose Admin


 


 Albuterol Sulfate


  (Ventolin Hfa


 Inhaler)  2 puff  PRN Q4HRS  PRN


 INH


 wheezing  4/21/22 20:30


    4/22/22 09:43





 


 Amiodarone HCl


  (Cordarone)  200 mg  BID


 PO


   4/21/22 21:00


    4/24/22 21:26





 


 Anastrozole


  (Arimidex)  1 mg  DAILY


 PO


   4/22/22 09:00


    4/24/22 08:09





 


 Donepezil HCl


  (Aricept)  10 mg  HS


 PO


   4/21/22 21:00


    4/24/22 21:26





 


 Duloxetine HCl


  (Cymbalta)  60 mg  DAILY


 PO


   4/22/22 09:00


 4/24/22 21:25 DC 4/24/22 08:07





 


 Gabapentin


  (Neurontin)  900 mg  BID


 PO


   4/21/22 21:00


 4/22/22 09:27 DC  





 


 Acetaminophen/


 Hydrocodone Bitart


  (Lortab 7.5/325)  1 tab  PRN BID  PRN


 PO


 MOD-SEV PAIN  4/21/22 20:00


     





 


 Memantine


  (Namenda)  10 mg  BID


 PO


   4/21/22 21:00


    4/24/22 21:25





 


 Simvastatin


  (Zocor)  20 mg  QHS


 PO


   4/21/22 21:00


    4/24/22 21:25





 


 Ascorbic Acid


  (Vitamin C)  250 mg  DAILY


 PO


   4/22/22 09:00


    4/24/22 08:07





 


 Vitamin D


  (Vitamin D3)  1,000 unit  DAILY


 PO


   4/22/22 09:00


    4/24/22 08:08





 


 Fluoxetine HCl


  (PROzac)  40 mg  DAILY


 PO


   4/22/22 09:00


 4/22/22 18:03 DC 4/22/22 09:50





 


 Cyanocobalamin


  (Vitamin B-12)  1,000 mcg  DAILY


 PO


   4/22/22 09:00


    4/24/22 08:08





 


 Prednisone


  (Prednisone)  5 mg  DAILY


 PO


   4/22/22 09:00


    4/24/22 08:08





 


 Fluticasone/


 Vilanterol


  (Breo Ellipta


 100-25 Mcg)  1 puff  DAILY


 INH


   4/22/22 09:00


    4/24/22 09:00





 


 Acetaminophen


  (Tylenol)  650 mg  PRN Q6HRS  PRN


 PO


 MILD PAIN / TEMP > 100.3'F  4/21/22 20:30


    4/22/22 22:51





 


 Multi-Ingredient


 Ointment


  (Analgesic Balm)  1 jose  PRN QID  PRN


 TP


 MUSCLE PAIN  4/21/22 20:30


     





 


 Al Hydroxide/Mg


 Hydroxide


  (Mylanta Plus Xs)  15 ml  PRN AFTMEALHC  PRN


 PO


 DYSPEPSIA  4/21/22 20:30


     





 


 Magnesium


 Hydroxide


  (Milk Of


 Magnesia)  2,400 mg  PRN QHS  PRN


 PO


 CONSTIPATION  4/21/22 20:30


    4/23/22 15:07





 


 Gabapentin


  (Neurontin)  300 mg  TID


 PO


   4/22/22 09:30


    4/24/22 21:25





 


 Potassium Chloride


  (Klor-Con)  20 meq  TID


 PO


   4/22/22 14:00


 4/22/22 12:28 DC  





 


 Potassium Chloride


  (Klor-Con)  20 meq  DAILY


 PO


   4/22/22 12:30


 4/23/22 15:19 DC  





 


 Donepezil HCl


  (Aricept)  10 mg  HS


 PO


   4/24/22 21:00


 4/24/22 21:25 DC  





 


 Memantine


  (Namenda)  10 mg  BID


 PO


   4/24/22 21:00


 4/24/22 21:23 DC  





 


 Sertraline HCl


  (Zoloft)  25 mg  DAILY


 PO


   4/25/22 09:00


 4/27/22 15:00   





 


 Sertraline HCl


  (Zoloft)  50 mg  DAILY


 PO


   4/28/22 09:00


     





 


 Quetiapine


 Fumarate


  (SEROquel)  12.5 mg  HS


 PO


   4/24/22 21:00


    4/24/22 21:27











Current Medications








 Medications


  (Trade)  Dose


 Ordered  Sig/Bryant


 Route


 PRN Reason  Start Time


 Stop Time Status Last Admin


Dose Admin


 


 Quetiapine


 Fumarate


  (SEROquel)  12.5 mg  HS


 PO


   4/24/22 21:00


    4/24/22 21:27











I have reviewed the current psychotropics carefully including drug interactions.

 Risk benefit ratio favors no change other than as noted in my dictated progress

note.





Diagnosis:


Problems:  


(1) Psychotic disorder


(2) Impulse control disorder, unspecified


(3) Anxiety disorder, unspecified


(4) Dementia, vascular, with depression


(5) Dementia, vascular, with delusions


(6) Major neurocognitive disorder


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


(8) Dementia in Alzheimer's disease with delusions











SHELTON MONTOYA MD                 Apr 24, 2022 22:00

## 2022-04-25 VITALS — SYSTOLIC BLOOD PRESSURE: 136 MMHG | DIASTOLIC BLOOD PRESSURE: 66 MMHG

## 2022-04-25 VITALS — DIASTOLIC BLOOD PRESSURE: 73 MMHG | SYSTOLIC BLOOD PRESSURE: 136 MMHG

## 2022-04-25 LAB
ALBUMIN SERPL-MCNC: 3.3 G/DL (ref 3.4–5)
ALBUMIN/GLOB SERPL: 1.1 {RATIO} (ref 1–1.7)
ALP SERPL-CCNC: 94 U/L (ref 46–116)
ALT SERPL-CCNC: 106 U/L (ref 14–59)
ANION GAP SERPL CALC-SCNC: 6 MMOL/L (ref 6–14)
AST SERPL-CCNC: 71 U/L (ref 15–37)
BILIRUB SERPL-MCNC: 0.5 MG/DL (ref 0.2–1)
BUN/CREAT SERPL: 13 (ref 6–20)
CA-I SERPL ISE-MCNC: 14 MG/DL (ref 7–20)
CALCIUM SERPL-MCNC: 8.9 MG/DL (ref 8.5–10.1)
CHLORIDE SERPL-SCNC: 105 MMOL/L (ref 98–107)
CO2 SERPL-SCNC: 31 MMOL/L (ref 21–32)
CREAT SERPL-MCNC: 1.1 MG/DL (ref 0.6–1)
ERYTHROCYTE [DISTWIDTH] IN BLOOD BY AUTOMATED COUNT: 14.6 % (ref 11.5–14.5)
GFR SERPLBLD BASED ON 1.73 SQ M-ARVRAT: 48.2 ML/MIN
GLOBULIN SER-MCNC: 3 G/DL (ref 2.2–3.8)
GLUCOSE SERPL-MCNC: 104 MG/DL (ref 70–99)
HCT VFR BLD CALC: 38.9 % (ref 36–47)
HGB BLD-MCNC: 13 G/DL (ref 12–15.5)
MCH RBC QN AUTO: 30 PG (ref 25–35)
MCHC RBC AUTO-ENTMCNC: 33 G/DL (ref 31–37)
MCV RBC AUTO: 91 FL (ref 79–100)
PLATELET # BLD AUTO: 154 X10^3/UL (ref 140–400)
POTASSIUM SERPL-SCNC: 3.4 MMOL/L (ref 3.5–5.1)
PROT SERPL-MCNC: 6.3 G/DL (ref 6.4–8.2)
RBC # BLD AUTO: 4.28 X10^6/UL (ref 3.5–5.4)
SODIUM SERPL-SCNC: 142 MMOL/L (ref 136–145)
WBC # BLD AUTO: 5.8 X10^3/UL (ref 4–11)

## 2022-04-25 RX ADMIN — DONEPEZIL HYDROCHLORIDE SCH MG: 10 TABLET ORAL at 21:30

## 2022-04-25 RX ADMIN — Medication SCH MCG: at 08:29

## 2022-04-25 RX ADMIN — AMIODARONE HYDROCHLORIDE SCH MG: 200 TABLET ORAL at 08:29

## 2022-04-25 RX ADMIN — SIMVASTATIN SCH MG: 20 TABLET, FILM COATED ORAL at 21:31

## 2022-04-25 RX ADMIN — ANASTROZOLE SCH MG: 1 TABLET, COATED ORAL at 08:33

## 2022-04-25 RX ADMIN — GABAPENTIN SCH MG: 300 CAPSULE ORAL at 08:31

## 2022-04-25 RX ADMIN — FLUTICASONE FUROATE AND VILANTEROL TRIFENATATE SCH PUFF: 100; 25 POWDER RESPIRATORY (INHALATION) at 08:28

## 2022-04-25 RX ADMIN — MEMANTINE HYDROCHLORIDE SCH MG: 10 TABLET ORAL at 08:29

## 2022-04-25 RX ADMIN — GABAPENTIN SCH MG: 300 CAPSULE ORAL at 21:43

## 2022-04-25 RX ADMIN — AMIODARONE HYDROCHLORIDE SCH MG: 200 TABLET ORAL at 21:31

## 2022-04-25 RX ADMIN — OXYCODONE HYDROCHLORIDE AND ACETAMINOPHEN SCH MG: 500 TABLET ORAL at 08:30

## 2022-04-25 RX ADMIN — MEMANTINE HYDROCHLORIDE SCH MG: 10 TABLET ORAL at 21:30

## 2022-04-25 RX ADMIN — GABAPENTIN SCH MG: 300 CAPSULE ORAL at 14:05

## 2022-04-25 RX ADMIN — QUETIAPINE FUMARATE SCH MG: 25 TABLET, FILM COATED ORAL at 21:31

## 2022-04-25 RX ADMIN — VITAMIN D, TAB 1000IU (100/BT) SCH UNIT: 25 TAB at 08:28

## 2022-04-25 RX ADMIN — SERTRALINE SCH MG: 25 TABLET, FILM COATED ORAL at 08:34

## 2022-04-25 NOTE — PDOC
Exam


Note:


Anuj Note:


This note is for 04/25/2022 covers elements not covered in my initial note.





Subjective:  The patient was seen individually on 04/25/2022, discussed and 

reviewed the chart with Kwaku SHARMA.  The patient slept 7-1/2 hours previous 

night.  Overall she remains confused, anxious, more so in the evening.  She 

believed she is in a hotel.  AST and ALT elevated.  We will defer to Dr. Ames.  

She has been otherwise pleasant, sleeping reasonably.  I met with her in her 

room.





Review of Systems:  No CV, , pulmonary, eye, ENT system symptoms on review.  

Reliability varies.





Mental Status Exam:  Patient is oriented to herself and situation.  She was 

confused, did not know where her room was even though she is standing in front 

of it and name is written on the outside.  Speech coherent.  Abstraction fair.  

Computation impaired.  Language function intact.  Mood and affect anxious but 

improved.





Laboratory Data:  Reviewed.





Impression:  Psychotic disorder unspecified.  Major neurocognitive disorder, 

Alzheimer, vascular with delusion, depression.  Anxiety disorder unspecified.  

Impulse control disorder unspecified.





Plan:  Continue rest psychotropics and as above.  Reviewed drug interactions, 

risk-benefit ratio.  Adjust as clinically indicated.





Assessment:


Vital Signs/I&O:





                                   Vital Signs








  Date Time  Temp Pulse Resp B/P (MAP) Pulse Ox O2 Delivery O2 Flow Rate FiO2


 


4/25/22 21:31  67  136/66    


 


4/25/22 16:00 97.6  18  97   


 


4/24/22 06:44      Room Air  














                                    I & O   


 


 4/24/22 4/24/22 4/25/22





 15:00 23:00 07:00


 


Intake Total 240 ml 560 ml 


 


Balance 240 ml 560 ml 








Labs:





                                Laboratory Tests








Test


 4/25/22


07:00 4/25/22


11:00


 


White Blood Count


 5.8 x10^3/uL


(4.0-11.0) 





 


Red Blood Count


 4.28 x10^6/uL


(3.50-5.40) 





 


Hemoglobin


 13.0 g/dL


(12.0-15.5) 





 


Hematocrit


 38.9 %


(36.0-47.0) 





 


Mean Corpuscular Volume


 91 fL ()


 





 


Mean Corpuscular Hemoglobin 30 pg (25-35)   


 


Mean Corpuscular Hemoglobin


Concent 33 g/dL


(31-37) 





 


Red Cell Distribution Width


 14.6 %


(11.5-14.5)  H 





 


Platelet Count


 154 x10^3/uL


(140-400) 





 


Sodium Level


 142 mmol/L


(136-145) 





 


Potassium Level


 3.4 mmol/L


(3.5-5.1)  L 





 


Chloride Level


 105 mmol/L


() 





 


Carbon Dioxide Level


 31 mmol/L


(21-32) 





 


Anion Gap 6 (6-14)   


 


Blood Urea Nitrogen


 14 mg/dL


(7-20) 





 


Creatinine


 1.1 mg/dL


(0.6-1.0)  H 





 


Estimated GFR


(Cockcroft-Gault) 48.2  


 





 


BUN/Creatinine Ratio 13 (6-20)   


 


Glucose Level


 104 mg/dL


(70-99)  H 





 


Calcium Level


 8.9 mg/dL


(8.5-10.1) 





 


Total Bilirubin


 0.5 mg/dL


(0.2-1.0) 





 


Aspartate Amino Transferase


(AST) 71 U/L (15-37)


H 





 


Alanine Aminotransferase (ALT)


 106 U/L


(14-59)  H 





 


Alkaline Phosphatase


 94 U/L


() 





 


Total Protein


 6.3 g/dL


(6.4-8.2)  L 





 


Albumin


 3.3 g/dL


(3.4-5.0)  L 





 


Albumin/Globulin Ratio 1.1 (1.0-1.7)   


 


POC SARS CoV-2 Antigen


 


 Negative


(NEGATIVE)











Current Medications:


Meds:





Current Medications








 Medications


  (Trade)  Dose


 Ordered  Sig/Bryant


 Route


 PRN Reason  Start Time


 Stop Time Status Last Admin


Dose Admin


 


 Sertraline HCl


  (Zoloft)  25 mg  DAILY


 PO


   4/25/22 09:00


 4/27/22 15:00  4/25/22 08:34











I have reviewed the current psychotropics carefully including drug interactions.

 Risk benefit ratio favors no change other than as noted in my dictated progress

note.





Diagnosis:


Problems:  


(1) Psychotic disorder


(2) Impulse control disorder, unspecified


(3) Anxiety disorder, unspecified


(4) Dementia, vascular, with depression


(5) Dementia, vascular, with delusions


(6) Major neurocognitive disorder


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


(8) Dementia in Alzheimer's disease with delusions











SHELTON MONTOYA MD                 Apr 25, 2022 22:56

## 2022-04-25 NOTE — PDOC
Exam


Note:


Anuj Note:


Please also refer to the separate dictated note~for this date of service 

dictated separately.~Patient seen individually. Discussed the patient with 

Nursing staff reviewed the chart.~Reviewed interim history and current 

functioning. Reviewed vital signs,~Labs/ Radiology~and current medications noted

below. Continue current treatment with the changes noted in the dictated 

addendum note





Assessment:


Vital Signs/I&O:





                                   Vital Signs








  Date Time  Temp Pulse Resp B/P (MAP) Pulse Ox O2 Delivery O2 Flow Rate FiO2


 


4/25/22 16:00 97.6 67 18 136/66 (89) 97   


 


4/24/22 06:44      Room Air  














                                    I & O   


 


 4/24/22 4/24/22 4/25/22





 15:00 23:00 07:00


 


Intake Total 240 ml 560 ml 


 


Balance 240 ml 560 ml 








Labs:





                                Laboratory Tests








Test


 4/25/22


07:00 4/25/22


11:00


 


White Blood Count


 5.8 x10^3/uL


(4.0-11.0) 





 


Red Blood Count


 4.28 x10^6/uL


(3.50-5.40) 





 


Hemoglobin


 13.0 g/dL


(12.0-15.5) 





 


Hematocrit


 38.9 %


(36.0-47.0) 





 


Mean Corpuscular Volume


 91 fL ()


 





 


Mean Corpuscular Hemoglobin 30 pg (25-35)   


 


Mean Corpuscular Hemoglobin


Concent 33 g/dL


(31-37) 





 


Red Cell Distribution Width


 14.6 %


(11.5-14.5)  H 





 


Platelet Count


 154 x10^3/uL


(140-400) 





 


Sodium Level


 142 mmol/L


(136-145) 





 


Potassium Level


 3.4 mmol/L


(3.5-5.1)  L 





 


Chloride Level


 105 mmol/L


() 





 


Carbon Dioxide Level


 31 mmol/L


(21-32) 





 


Anion Gap 6 (6-14)   


 


Blood Urea Nitrogen


 14 mg/dL


(7-20) 





 


Creatinine


 1.1 mg/dL


(0.6-1.0)  H 





 


Estimated GFR


(Cockcroft-Gault) 48.2  


 





 


BUN/Creatinine Ratio 13 (6-20)   


 


Glucose Level


 104 mg/dL


(70-99)  H 





 


Calcium Level


 8.9 mg/dL


(8.5-10.1) 





 


Total Bilirubin


 0.5 mg/dL


(0.2-1.0) 





 


Aspartate Amino Transferase


(AST) 71 U/L (15-37)


H 





 


Alanine Aminotransferase (ALT)


 106 U/L


(14-59)  H 





 


Alkaline Phosphatase


 94 U/L


() 





 


Total Protein


 6.3 g/dL


(6.4-8.2)  L 





 


Albumin


 3.3 g/dL


(3.4-5.0)  L 





 


Albumin/Globulin Ratio 1.1 (1.0-1.7)   


 


POC SARS CoV-2 Antigen


 


 Negative


(NEGATIVE)











Current Medications:


Meds:





Laboratory Tests








Test


 4/25/22


07:00 4/25/22


11:00


 


White Blood Count 5.8 x10^3/uL  


 


Red Blood Count 4.28 x10^6/uL  


 


Hemoglobin 13.0 g/dL  


 


Hematocrit 38.9 %  


 


Mean Corpuscular Volume 91 fL  


 


Mean Corpuscular Hemoglobin 30 pg  


 


Mean Corpuscular Hemoglobin


Concent 33 g/dL 


 





 


Red Cell Distribution Width 14.6 %  


 


Platelet Count 154 x10^3/uL  


 


Sodium Level 142 mmol/L  


 


Potassium Level 3.4 mmol/L  


 


Chloride Level 105 mmol/L  


 


Carbon Dioxide Level 31 mmol/L  


 


Anion Gap 6  


 


Blood Urea Nitrogen 14 mg/dL  


 


Creatinine 1.1 mg/dL  


 


Estimated GFR


(Cockcroft-Gault) 48.2 


 





 


BUN/Creatinine Ratio 13  


 


Glucose Level 104 mg/dL  


 


Calcium Level 8.9 mg/dL  


 


Total Bilirubin 0.5 mg/dL  


 


Aspartate Amino Transf


(AST/SGOT) 71 U/L 


 





 


Alanine Aminotransferase


(ALT/SGPT) 106 U/L 


 





 


Alkaline Phosphatase 94 U/L  


 


Total Protein 6.3 g/dL  


 


Albumin 3.3 g/dL  


 


Albumin/Globulin Ratio 1.1  


 


POC SARS CoV-2 Antigen  Negative 








Current Medications








 Medications


  (Trade)  Dose


 Ordered  Sig/Bryant


 Route


 PRN Reason  Start Time


 Stop Time Status Last Admin


Dose Admin


 


 Albuterol Sulfate


  (Ventolin Hfa


 Inhaler)  2 puff  PRN Q4HRS  PRN


 INH


 wheezing  4/21/22 20:30


    4/22/22 09:43





 


 Amiodarone HCl


  (Cordarone)  200 mg  BID


 PO


   4/21/22 21:00


    4/25/22 08:29





 


 Anastrozole


  (Arimidex)  1 mg  DAILY


 PO


   4/22/22 09:00


    4/25/22 08:33





 


 Donepezil HCl


  (Aricept)  10 mg  HS


 PO


   4/21/22 21:00


    4/24/22 21:26





 


 Duloxetine HCl


  (Cymbalta)  60 mg  DAILY


 PO


   4/22/22 09:00


 4/24/22 21:25 DC 4/24/22 08:07





 


 Gabapentin


  (Neurontin)  900 mg  BID


 PO


   4/21/22 21:00


 4/22/22 09:27 DC  





 


 Acetaminophen/


 Hydrocodone Bitart


  (Lortab 7.5/325)  1 tab  PRN BID  PRN


 PO


 MOD-SEV PAIN  4/21/22 20:00


     





 


 Memantine


  (Namenda)  10 mg  BID


 PO


   4/21/22 21:00


    4/25/22 08:29





 


 Simvastatin


  (Zocor)  20 mg  QHS


 PO


   4/21/22 21:00


    4/24/22 21:25





 


 Ascorbic Acid


  (Vitamin C)  250 mg  DAILY


 PO


   4/22/22 09:00


    4/25/22 08:30





 


 Vitamin D


  (Vitamin D3)  1,000 unit  DAILY


 PO


   4/22/22 09:00


    4/25/22 08:28





 


 Fluoxetine HCl


  (PROzac)  40 mg  DAILY


 PO


   4/22/22 09:00


 4/22/22 18:03 DC 4/22/22 09:50





 


 Cyanocobalamin


  (Vitamin B-12)  1,000 mcg  DAILY


 PO


   4/22/22 09:00


    4/25/22 08:29





 


 Prednisone


  (Prednisone)  5 mg  DAILY


 PO


   4/22/22 09:00


    4/25/22 08:29





 


 Fluticasone/


 Vilanterol


  (Breo Ellipta


 100-25 Mcg)  1 puff  DAILY


 INH


   4/22/22 09:00


    4/25/22 08:28





 


 Acetaminophen


  (Tylenol)  650 mg  PRN Q6HRS  PRN


 PO


 MILD PAIN / TEMP > 100.3'F  4/21/22 20:30


    4/22/22 22:51





 


 Multi-Ingredient


 Ointment


  (Analgesic Balm)  1 jose  PRN QID  PRN


 TP


 MUSCLE PAIN  4/21/22 20:30


     





 


 Al Hydroxide/Mg


 Hydroxide


  (Mylanta Plus Xs)  15 ml  PRN AFTMEALHC  PRN


 PO


 DYSPEPSIA  4/21/22 20:30


     





 


 Magnesium


 Hydroxide


  (Milk Of


 Magnesia)  2,400 mg  PRN QHS  PRN


 PO


 CONSTIPATION  4/21/22 20:30


    4/23/22 15:07





 


 Gabapentin


  (Neurontin)  300 mg  TID


 PO


   4/22/22 09:30


    4/25/22 14:05





 


 Potassium Chloride


  (Klor-Con)  20 meq  TID


 PO


   4/22/22 14:00


 4/22/22 12:28 DC  





 


 Potassium Chloride


  (Klor-Con)  20 meq  DAILY


 PO


   4/22/22 12:30


 4/23/22 15:19 DC  





 


 Donepezil HCl


  (Aricept)  10 mg  HS


 PO


   4/24/22 21:00


 4/24/22 21:25 DC  





 


 Memantine


  (Namenda)  10 mg  BID


 PO


   4/24/22 21:00


 4/24/22 21:23 DC  





 


 Sertraline HCl


  (Zoloft)  25 mg  DAILY


 PO


   4/25/22 09:00


 4/27/22 15:00  4/25/22 08:34





 


 Sertraline HCl


  (Zoloft)  50 mg  DAILY


 PO


   4/28/22 09:00


     





 


 Quetiapine


 Fumarate


  (SEROquel)  12.5 mg  HS


 PO


   4/24/22 21:00


    4/24/22 21:27











Current Medications








 Medications


  (Trade)  Dose


 Ordered  Sig/Bryant


 Route


 PRN Reason  Start Time


 Stop Time Status Last Admin


Dose Admin


 


 Sertraline HCl


  (Zoloft)  25 mg  DAILY


 PO


   4/25/22 09:00


 4/27/22 15:00  4/25/22 08:34











I have reviewed the current psychotropics carefully including drug interactions.

 Risk benefit ratio favors no change other than as noted in my dictated progress

note.





Diagnosis:


Problems:  


(1) Psychotic disorder


(2) Impulse control disorder, unspecified


(3) Anxiety disorder, unspecified


(4) Dementia, vascular, with depression


(5) Dementia, vascular, with delusions


(6) Major neurocognitive disorder


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


(8) Dementia in Alzheimer's disease with delusions











SHELTON MONTOYA MD                 Apr 25, 2022 21:25

## 2022-04-25 NOTE — PDOC
Exam


Note:


Anuj Note:


This note is a late entry for 04/24/2022 covers elements not covered in my 

initial note.





Subjective:  The patient was seen individually on 04/24/2022, discussed and 

reviewed the chart with Za SHARMA.  The patient slept 4-1/2 hours previous 

night.  We will clarify the patients psychotropics.  We will restart Aricept 10

mg a day, Namenda 10 mg b.i.d. and instead of restarting Cymbalta, we will start

Zoloft 25 mg a day for 3 days then 50 mg a day thereafter.  She has been 

paranoid, suspicious.  We will add Seroquel 12.5 mg h.s.  I met with her outside

her room in the hallway.  She appeared confused, was helping one of the other 

patients.





Review of Systems:  No CV, , pulmonary, eye, ENT system symptoms on review.





Mental Status Exam:  Patient is oriented to herself and situation.  Speech 

coherent, rapid at times, somewhat loose associations.  Abstraction fair.  

Computation impaired.  Language function intact.  Mood and affect anxious but 

improved.





Laboratory Data:  Reviewed.





Impression:  Psychotic disorder unspecified.  Major neurocognitive disorder, 

Alzheimer, vascular with delusion, depression.  Anxiety disorder unspecified.  

Impulse control disorder unspecified.





Plan:  Continue rest psychotropics and as above.  Reviewed drug interactions, 

risk-benefit ratio.





Assessment:


Vital Signs/I&O:





                                   Vital Signs








  Date Time  Temp Pulse Resp B/P (MAP) Pulse Ox O2 Delivery O2 Flow Rate FiO2


 


4/25/22 08:29  63  136/73    


 


4/25/22 06:37 97.1  18  95   


 


4/24/22 06:44      Room Air  














                                    I & O   


 


 4/24/22 4/24/22 4/25/22





 15:00 23:00 07:00


 


Intake Total 240 ml 560 ml 


 


Balance 240 ml 560 ml 








Labs:





                                Laboratory Tests








Test


 4/25/22


07:00


 


White Blood Count


 5.8 x10^3/uL


(4.0-11.0)


 


Red Blood Count


 4.28 x10^6/uL


(3.50-5.40)


 


Hemoglobin


 13.0 g/dL


(12.0-15.5)


 


Hematocrit


 38.9 %


(36.0-47.0)


 


Mean Corpuscular Volume


 91 fL ()





 


Mean Corpuscular Hemoglobin 30 pg (25-35)  


 


Mean Corpuscular Hemoglobin


Concent 33 g/dL


(31-37)


 


Red Cell Distribution Width


 14.6 %


(11.5-14.5)  H


 


Platelet Count


 154 x10^3/uL


(140-400)


 


Sodium Level


 142 mmol/L


(136-145)


 


Potassium Level


 3.4 mmol/L


(3.5-5.1)  L


 


Chloride Level


 105 mmol/L


()


 


Carbon Dioxide Level


 31 mmol/L


(21-32)


 


Anion Gap 6 (6-14)  


 


Blood Urea Nitrogen


 14 mg/dL


(7-20)


 


Creatinine


 1.1 mg/dL


(0.6-1.0)  H


 


Estimated GFR


(Cockcroft-Gault) 48.2  





 


BUN/Creatinine Ratio 13 (6-20)  


 


Glucose Level


 104 mg/dL


(70-99)  H


 


Calcium Level


 8.9 mg/dL


(8.5-10.1)


 


Total Bilirubin


 0.5 mg/dL


(0.2-1.0)


 


Aspartate Amino Transferase


(AST) 71 U/L (15-37)


H


 


Alanine Aminotransferase (ALT)


 106 U/L


(14-59)  H


 


Alkaline Phosphatase


 94 U/L


()


 


Total Protein


 6.3 g/dL


(6.4-8.2)  L


 


Albumin


 3.3 g/dL


(3.4-5.0)  L


 


Albumin/Globulin Ratio 1.1 (1.0-1.7)  











Current Medications:


Meds:





Current Medications








 Medications


  (Trade)  Dose


 Ordered  Sig/Bryant


 Route


 PRN Reason  Start Time


 Stop Time Status Last Admin


Dose Admin


 


 Sertraline HCl


  (Zoloft)  25 mg  DAILY


 PO


   4/25/22 09:00


 4/27/22 15:00  4/25/22 08:34





 


 Quetiapine


 Fumarate


  (SEROquel)  12.5 mg  HS


 PO


   4/24/22 21:00


    4/24/22 21:27











I have reviewed the current psychotropics carefully including drug interactions.

 Risk benefit ratio favors no change other than as noted in my dictated progress

note.





Diagnosis:


Problems:  


(1) Psychotic disorder


(2) Impulse control disorder, unspecified


(3) Anxiety disorder, unspecified


(4) Dementia, vascular, with depression


(5) Dementia, vascular, with delusions


(6) Major neurocognitive disorder


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


(8) Dementia in Alzheimer's disease with delusions











LINDSAY,MAN M MD                 Apr 25, 2022 09:40

## 2022-04-26 VITALS — DIASTOLIC BLOOD PRESSURE: 69 MMHG | SYSTOLIC BLOOD PRESSURE: 145 MMHG

## 2022-04-26 VITALS — DIASTOLIC BLOOD PRESSURE: 68 MMHG | SYSTOLIC BLOOD PRESSURE: 111 MMHG

## 2022-04-26 RX ADMIN — Medication SCH MCG: at 08:44

## 2022-04-26 RX ADMIN — QUETIAPINE FUMARATE SCH MG: 25 TABLET, FILM COATED ORAL at 20:02

## 2022-04-26 RX ADMIN — SIMVASTATIN SCH MG: 20 TABLET, FILM COATED ORAL at 20:02

## 2022-04-26 RX ADMIN — GABAPENTIN SCH MG: 300 CAPSULE ORAL at 08:43

## 2022-04-26 RX ADMIN — VITAMIN D, TAB 1000IU (100/BT) SCH UNIT: 25 TAB at 08:45

## 2022-04-26 RX ADMIN — MEMANTINE HYDROCHLORIDE SCH MG: 10 TABLET ORAL at 20:02

## 2022-04-26 RX ADMIN — DONEPEZIL HYDROCHLORIDE SCH MG: 10 TABLET ORAL at 20:01

## 2022-04-26 RX ADMIN — GABAPENTIN SCH MG: 300 CAPSULE ORAL at 20:02

## 2022-04-26 RX ADMIN — AMIODARONE HYDROCHLORIDE SCH MG: 200 TABLET ORAL at 20:01

## 2022-04-26 RX ADMIN — GABAPENTIN SCH MG: 300 CAPSULE ORAL at 17:00

## 2022-04-26 RX ADMIN — ANASTROZOLE SCH MG: 1 TABLET, COATED ORAL at 08:50

## 2022-04-26 RX ADMIN — AMIODARONE HYDROCHLORIDE SCH MG: 200 TABLET ORAL at 08:46

## 2022-04-26 RX ADMIN — MEMANTINE HYDROCHLORIDE SCH MG: 10 TABLET ORAL at 08:45

## 2022-04-26 RX ADMIN — OXYCODONE HYDROCHLORIDE AND ACETAMINOPHEN SCH MG: 500 TABLET ORAL at 08:51

## 2022-04-26 RX ADMIN — SERTRALINE SCH MG: 25 TABLET, FILM COATED ORAL at 08:44

## 2022-04-26 RX ADMIN — FLUTICASONE FUROATE AND VILANTEROL TRIFENATATE SCH PUFF: 100; 25 POWDER RESPIRATORY (INHALATION) at 08:50

## 2022-04-26 NOTE — PDOC
Exam


Note:


Anuj Note:


Please also refer to the separate dictated note~for this date of service 

dictated separately.~Patient seen individually. Discussed the patient with 

Nursing staff reviewed the chart.~Reviewed interim history and current 

functioning. Reviewed vital signs,~Labs/ Radiology~and current medications noted

below. Continue current treatment with the changes noted in the dictated 

addendum note





Assessment:


Vital Signs/I&O:





                                   Vital Signs








  Date Time  Temp Pulse Resp B/P (MAP) Pulse Ox O2 Delivery O2 Flow Rate FiO2


 


4/26/22 20:01  70  111/68    


 


4/26/22 16:10 97.0  19  96   


 


4/24/22 06:44      Room Air  














                                    I & O   


 


 4/25/22 4/25/22 4/26/22





 15:00 23:00 07:00


 


Intake Total 780 ml 340 ml 


 


Balance 780 ml 340 ml 











Current Medications:


Meds:





Current Medications








 Medications


  (Trade)  Dose


 Ordered  Sig/Bryant


 Route


 PRN Reason  Start Time


 Stop Time Status Last Admin


Dose Admin


 


 Albuterol Sulfate


  (Ventolin Hfa


 Inhaler)  2 puff  PRN Q4HRS  PRN


 INH


 wheezing  4/21/22 20:30


    4/22/22 09:43





 


 Amiodarone HCl


  (Cordarone)  200 mg  BID


 PO


   4/21/22 21:00


    4/26/22 20:01





 


 Anastrozole


  (Arimidex)  1 mg  DAILY


 PO


   4/22/22 09:00


    4/26/22 08:50





 


 Donepezil HCl


  (Aricept)  10 mg  HS


 PO


   4/21/22 21:00


    4/26/22 20:01





 


 Duloxetine HCl


  (Cymbalta)  60 mg  DAILY


 PO


   4/22/22 09:00


 4/24/22 21:25 DC 4/24/22 08:07





 


 Gabapentin


  (Neurontin)  900 mg  BID


 PO


   4/21/22 21:00


 4/22/22 09:27 DC  





 


 Acetaminophen/


 Hydrocodone Bitart


  (Lortab 7.5/325)  1 tab  PRN BID  PRN


 PO


 MOD-SEV PAIN  4/21/22 20:00


     





 


 Memantine


  (Namenda)  10 mg  BID


 PO


   4/21/22 21:00


    4/26/22 20:02





 


 Simvastatin


  (Zocor)  20 mg  QHS


 PO


   4/21/22 21:00


    4/26/22 20:02





 


 Ascorbic Acid


  (Vitamin C)  250 mg  DAILY


 PO


   4/22/22 09:00


    4/26/22 08:51





 


 Vitamin D


  (Vitamin D3)  1,000 unit  DAILY


 PO


   4/22/22 09:00


    4/26/22 08:45





 


 Fluoxetine HCl


  (PROzac)  40 mg  DAILY


 PO


   4/22/22 09:00


 4/22/22 18:03 DC 4/22/22 09:50





 


 Cyanocobalamin


  (Vitamin B-12)  1,000 mcg  DAILY


 PO


   4/22/22 09:00


    4/26/22 08:44





 


 Prednisone


  (Prednisone)  5 mg  DAILY


 PO


   4/22/22 09:00


    4/26/22 08:46





 


 Fluticasone/


 Vilanterol


  (Breo Ellipta


 100-25 Mcg)  1 puff  DAILY


 INH


   4/22/22 09:00


    4/26/22 08:50





 


 Acetaminophen


  (Tylenol)  650 mg  PRN Q6HRS  PRN


 PO


 MILD PAIN / TEMP > 100.3'F  4/21/22 20:30


    4/22/22 22:51





 


 Multi-Ingredient


 Ointment


  (Analgesic Balm)  1 jose  PRN QID  PRN


 TP


 MUSCLE PAIN  4/21/22 20:30


     





 


 Al Hydroxide/Mg


 Hydroxide


  (Mylanta Plus Xs)  15 ml  PRN AFTMEALHC  PRN


 PO


 DYSPEPSIA  4/21/22 20:30


     





 


 Magnesium


 Hydroxide


  (Milk Of


 Magnesia)  2,400 mg  PRN QHS  PRN


 PO


 CONSTIPATION  4/21/22 20:30


    4/23/22 15:07





 


 Gabapentin


  (Neurontin)  300 mg  TID


 PO


   4/22/22 09:30


    4/26/22 20:02





 


 Potassium Chloride


  (Klor-Con)  20 meq  TID


 PO


   4/22/22 14:00


 4/22/22 12:28 DC  





 


 Potassium Chloride


  (Klor-Con)  20 meq  DAILY


 PO


   4/22/22 12:30


 4/23/22 15:19 DC  





 


 Donepezil HCl


  (Aricept)  10 mg  HS


 PO


   4/24/22 21:00


 4/24/22 21:25 DC  





 


 Memantine


  (Namenda)  10 mg  BID


 PO


   4/24/22 21:00


 4/24/22 21:23 DC  





 


 Sertraline HCl


  (Zoloft)  25 mg  DAILY


 PO


   4/25/22 09:00


 4/27/22 15:00  4/26/22 08:44





 


 Sertraline HCl


  (Zoloft)  50 mg  DAILY


 PO


   4/28/22 09:00


     





 


 Quetiapine


 Fumarate


  (SEROquel)  12.5 mg  HS


 PO


   4/24/22 21:00


    4/26/22 20:02











I have reviewed the current psychotropics carefully including drug interactions.

 Risk benefit ratio favors no change other than as noted in my dictated progress

note.





Diagnosis:


Problems:  


(1) Psychotic disorder


(2) Impulse control disorder, unspecified


(3) Anxiety disorder, unspecified


(4) Dementia, vascular, with depression


(5) Dementia, vascular, with delusions


(6) Major neurocognitive disorder


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


(8) Dementia in Alzheimer's disease with delusions











SHELTON MONTOYA MD                 Apr 26, 2022 21:45

## 2022-04-27 VITALS — SYSTOLIC BLOOD PRESSURE: 154 MMHG | DIASTOLIC BLOOD PRESSURE: 68 MMHG

## 2022-04-27 VITALS — DIASTOLIC BLOOD PRESSURE: 78 MMHG | SYSTOLIC BLOOD PRESSURE: 143 MMHG

## 2022-04-27 RX ADMIN — Medication SCH MCG: at 08:50

## 2022-04-27 RX ADMIN — VITAMIN D, TAB 1000IU (100/BT) SCH UNIT: 25 TAB at 08:48

## 2022-04-27 RX ADMIN — OXYCODONE HYDROCHLORIDE AND ACETAMINOPHEN SCH MG: 500 TABLET ORAL at 08:53

## 2022-04-27 RX ADMIN — ALBUTEROL SULFATE PRN PUFF: 90 AEROSOL, METERED RESPIRATORY (INHALATION) at 08:54

## 2022-04-27 RX ADMIN — ANASTROZOLE SCH MG: 1 TABLET, COATED ORAL at 08:49

## 2022-04-27 RX ADMIN — GABAPENTIN SCH MG: 300 CAPSULE ORAL at 19:51

## 2022-04-27 RX ADMIN — MEMANTINE HYDROCHLORIDE SCH MG: 10 TABLET ORAL at 19:51

## 2022-04-27 RX ADMIN — GABAPENTIN SCH MG: 300 CAPSULE ORAL at 08:50

## 2022-04-27 RX ADMIN — SIMVASTATIN SCH MG: 20 TABLET, FILM COATED ORAL at 19:51

## 2022-04-27 RX ADMIN — DONEPEZIL HYDROCHLORIDE SCH MG: 10 TABLET ORAL at 19:50

## 2022-04-27 RX ADMIN — GABAPENTIN SCH MG: 300 CAPSULE ORAL at 13:15

## 2022-04-27 RX ADMIN — SERTRALINE SCH MG: 25 TABLET, FILM COATED ORAL at 08:50

## 2022-04-27 RX ADMIN — QUETIAPINE FUMARATE SCH MG: 25 TABLET, FILM COATED ORAL at 19:51

## 2022-04-27 RX ADMIN — AMIODARONE HYDROCHLORIDE SCH MG: 200 TABLET ORAL at 19:51

## 2022-04-27 RX ADMIN — FLUTICASONE FUROATE AND VILANTEROL TRIFENATATE SCH PUFF: 100; 25 POWDER RESPIRATORY (INHALATION) at 08:54

## 2022-04-27 RX ADMIN — AMIODARONE HYDROCHLORIDE SCH MG: 200 TABLET ORAL at 08:50

## 2022-04-27 RX ADMIN — MEMANTINE HYDROCHLORIDE SCH MG: 10 TABLET ORAL at 08:50

## 2022-04-27 NOTE — PDOC
Exam


Note:


Anuj Note:


This note is for 04/26/2022 covers elements not covered in my initial note.





Subjective:  The patient was seen individually on 04/26/2022, discussed and 

reviewed the chart with Susan SHARMA.  The patient slept 6-1/4 hours previous 

night.  She remains confused with significant short-term memory deficits.  She 

has been pushing another demented wheelchair bound patient around the unit.  I 

have reviewed approximately 80 pages of medical records from [facility].  There was a question whether she was diagnosed with normal pressure 

hydrocephalus while there by Dr. Medina.  In fact Dr. Rodriguez records and the MRI

indicate that she has some enlargement of the ventricular system due to cortical

atrophy rather than normal pressure hydrocephalus.  She is getting ready to get 

into bed.





Review of Systems:  No CV, , pulmonary, eye, ENT system symptoms on review.  

Positive for some tiredness.





Mental Status Exam:  Patient is oriented to herself and situation.  Speech 

coherent.  Abstraction fair.  Computation impaired.  Language function intact.  

Mood and affect anxious but improved.





Laboratory Data:  Reviewed.





Impression:  Psychotic disorder unspecified.  Major neurocognitive disorder, 

Alzheimer, vascular with delusion, depression.  Anxiety disorder unspecified.  

Impulse control disorder unspecified.





Plan:  Continue rest psychotropics Aricept, Namenda, Zoloft which is gradually 

increasing on 04/28 along with gabapentin and Seroquel.  Reviewed drug 

interactions, risk-benefit ratio.  Adjust as clinically indicated.





Assessment:


Vital Signs/I&O:





                                   Vital Signs








  Date Time  Temp Pulse Resp B/P (MAP) Pulse Ox O2 Delivery O2 Flow Rate FiO2


 


4/27/22 06:05 97.4 65 17 143/78 (99) 98   


 


4/24/22 06:44      Room Air  














                                    I & O   


 


 4/26/22 4/26/22 4/27/22





 15:00 23:00 07:00


 


Intake Total 1020 ml 600 ml 


 


Balance 1020 ml 600 ml 











Current Medications:


I have reviewed the current psychotropics carefully including drug interactions.

 Risk benefit ratio favors no change other than as noted in my dictated progress

note.





Diagnosis:


Problems:  


(1) Psychotic disorder


(2) Impulse control disorder, unspecified


(3) Anxiety disorder, unspecified


(4) Dementia, vascular, with depression


(5) Dementia, vascular, with delusions


(6) Major neurocognitive disorder


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


(8) Dementia in Alzheimer's disease with delusions











SHELTON MONTOYA MD                 Apr 27, 2022 06:15

## 2022-04-27 NOTE — PDOC
Exam


Note:


Anuj Note:


Please also refer to the separate dictated note~for this date of service 

dictated separately.~Patient seen individually. Discussed the patient with 

Nursing staff reviewed the chart.~Reviewed interim history and current 

functioning. Reviewed vital signs,~Labs/ Radiology~and current medications noted

below. Continue current treatment with the changes noted in the dictated 

addendum note





Assessment:


Vital Signs/I&O:





                                   Vital Signs








  Date Time  Temp Pulse Resp B/P (MAP) Pulse Ox O2 Delivery O2 Flow Rate FiO2


 


4/27/22 19:51  75  154/68    


 


4/27/22 15:53 98.4  18  93 Room Air  














                                    I & O   


 


 4/26/22 4/26/22 4/27/22





 15:00 23:00 07:00


 


Intake Total 1020 ml 600 ml 


 


Balance 1020 ml 600 ml 











Current Medications:


Meds:





Current Medications








 Medications


  (Trade)  Dose


 Ordered  Sig/Bryant


 Route


 PRN Reason  Start Time


 Stop Time Status Last Admin


Dose Admin


 


 Albuterol Sulfate


  (Ventolin Hfa


 Inhaler)  2 puff  PRN Q4HRS  PRN


 INH


 wheezing  4/21/22 20:30


    4/27/22 08:54





 


 Amiodarone HCl


  (Cordarone)  200 mg  BID


 PO


   4/21/22 21:00


    4/27/22 19:51





 


 Anastrozole


  (Arimidex)  1 mg  DAILY


 PO


   4/22/22 09:00


    4/27/22 08:49





 


 Donepezil HCl


  (Aricept)  10 mg  HS


 PO


   4/21/22 21:00


    4/27/22 19:50





 


 Duloxetine HCl


  (Cymbalta)  60 mg  DAILY


 PO


   4/22/22 09:00


 4/24/22 21:25 DC 4/24/22 08:07





 


 Gabapentin


  (Neurontin)  900 mg  BID


 PO


   4/21/22 21:00


 4/22/22 09:27 DC  





 


 Acetaminophen/


 Hydrocodone Bitart


  (Lortab 7.5/325)  1 tab  PRN BID  PRN


 PO


 MOD-SEV PAIN  4/21/22 20:00


     





 


 Memantine


  (Namenda)  10 mg  BID


 PO


   4/21/22 21:00


    4/27/22 19:51





 


 Simvastatin


  (Zocor)  20 mg  QHS


 PO


   4/21/22 21:00


    4/27/22 19:51





 


 Ascorbic Acid


  (Vitamin C)  250 mg  DAILY


 PO


   4/22/22 09:00


    4/27/22 08:53





 


 Vitamin D


  (Vitamin D3)  1,000 unit  DAILY


 PO


   4/22/22 09:00


    4/27/22 08:48





 


 Fluoxetine HCl


  (PROzac)  40 mg  DAILY


 PO


   4/22/22 09:00


 4/22/22 18:03 DC 4/22/22 09:50





 


 Cyanocobalamin


  (Vitamin B-12)  1,000 mcg  DAILY


 PO


   4/22/22 09:00


    4/27/22 08:50





 


 Prednisone


  (Prednisone)  5 mg  DAILY


 PO


   4/22/22 09:00


    4/27/22 08:50





 


 Fluticasone/


 Vilanterol


  (Breo Ellipta


 100-25 Mcg)  1 puff  DAILY


 INH


   4/22/22 09:00


    4/27/22 08:54





 


 Acetaminophen


  (Tylenol)  650 mg  PRN Q6HRS  PRN


 PO


 MILD PAIN / TEMP > 100.3'F  4/21/22 20:30


    4/22/22 22:51





 


 Multi-Ingredient


 Ointment


  (Analgesic Balm)  1 jose  PRN QID  PRN


 TP


 MUSCLE PAIN  4/21/22 20:30


     





 


 Al Hydroxide/Mg


 Hydroxide


  (Mylanta Plus Xs)  15 ml  PRN AFTMEALHC  PRN


 PO


 DYSPEPSIA  4/21/22 20:30


     





 


 Magnesium


 Hydroxide


  (Milk Of


 Magnesia)  2,400 mg  PRN QHS  PRN


 PO


 CONSTIPATION  4/21/22 20:30


    4/23/22 15:07





 


 Gabapentin


  (Neurontin)  300 mg  TID


 PO


   4/22/22 09:30


    4/27/22 19:51





 


 Potassium Chloride


  (Klor-Con)  20 meq  TID


 PO


   4/22/22 14:00


 4/22/22 12:28 DC  





 


 Potassium Chloride


  (Klor-Con)  20 meq  DAILY


 PO


   4/22/22 12:30


 4/23/22 15:19 DC  





 


 Donepezil HCl


  (Aricept)  10 mg  HS


 PO


   4/24/22 21:00


 4/24/22 21:25 DC  





 


 Memantine


  (Namenda)  10 mg  BID


 PO


   4/24/22 21:00


 4/24/22 21:23 DC  





 


 Sertraline HCl


  (Zoloft)  25 mg  DAILY


 PO


   4/25/22 09:00


 4/27/22 15:00 DC 4/27/22 08:50





 


 Sertraline HCl


  (Zoloft)  50 mg  DAILY


 PO


   4/28/22 09:00


     





 


 Quetiapine


 Fumarate


  (SEROquel)  12.5 mg  HS


 PO


   4/24/22 21:00


    4/27/22 19:51











I have reviewed the current psychotropics carefully including drug interactions.

 Risk benefit ratio favors no change other than as noted in my dictated progress

note.





Diagnosis:


Problems:  


(1) Psychotic disorder


(2) Impulse control disorder, unspecified


(3) Anxiety disorder, unspecified


(4) Dementia, vascular, with depression


(5) Dementia, vascular, with delusions


(6) Major neurocognitive disorder


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


(8) Dementia in Alzheimer's disease with delusions











SHELTON MONTOYA MD                 Apr 27, 2022 21:31

## 2022-04-28 VITALS — DIASTOLIC BLOOD PRESSURE: 82 MMHG | SYSTOLIC BLOOD PRESSURE: 142 MMHG

## 2022-04-28 VITALS — SYSTOLIC BLOOD PRESSURE: 161 MMHG | DIASTOLIC BLOOD PRESSURE: 71 MMHG

## 2022-04-28 RX ADMIN — DONEPEZIL HYDROCHLORIDE SCH MG: 10 TABLET ORAL at 20:29

## 2022-04-28 RX ADMIN — OXYCODONE HYDROCHLORIDE AND ACETAMINOPHEN SCH MG: 500 TABLET ORAL at 07:40

## 2022-04-28 RX ADMIN — VITAMIN D, TAB 1000IU (100/BT) SCH UNIT: 25 TAB at 07:40

## 2022-04-28 RX ADMIN — QUETIAPINE FUMARATE SCH MG: 25 TABLET, FILM COATED ORAL at 20:29

## 2022-04-28 RX ADMIN — Medication SCH MCG: at 07:40

## 2022-04-28 RX ADMIN — FLUTICASONE FUROATE AND VILANTEROL TRIFENATATE SCH PUFF: 100; 25 POWDER RESPIRATORY (INHALATION) at 07:41

## 2022-04-28 RX ADMIN — ACETAMINOPHEN PRN MG: 325 TABLET, FILM COATED ORAL at 20:30

## 2022-04-28 RX ADMIN — GABAPENTIN SCH MG: 300 CAPSULE ORAL at 13:16

## 2022-04-28 RX ADMIN — ANASTROZOLE SCH MG: 1 TABLET, COATED ORAL at 07:41

## 2022-04-28 RX ADMIN — MEMANTINE HYDROCHLORIDE SCH MG: 10 TABLET ORAL at 20:29

## 2022-04-28 RX ADMIN — AMIODARONE HYDROCHLORIDE SCH MG: 200 TABLET ORAL at 07:40

## 2022-04-28 RX ADMIN — GABAPENTIN SCH MG: 300 CAPSULE ORAL at 07:40

## 2022-04-28 RX ADMIN — MEMANTINE HYDROCHLORIDE SCH MG: 10 TABLET ORAL at 07:39

## 2022-04-28 RX ADMIN — AMIODARONE HYDROCHLORIDE SCH MG: 200 TABLET ORAL at 20:29

## 2022-04-28 RX ADMIN — SIMVASTATIN SCH MG: 20 TABLET, FILM COATED ORAL at 20:29

## 2022-04-28 RX ADMIN — SERTRALINE HYDROCHLORIDE SCH MG: 50 TABLET ORAL at 07:40

## 2022-04-28 RX ADMIN — GABAPENTIN SCH MG: 300 CAPSULE ORAL at 20:31

## 2022-04-28 NOTE — PDOC
Exam


Note:


Anuj Note:


This note is for 04/27/2022 covers elements not covered in my initial note.





Subjective:  The patient was seen individually on 04/27/2022, discussed and 

reviewed the chart with Fronica LPN.  The patient slept 6-1/2 hours previous 

night.  I met with the patient in her room.  She remains confused, forgetful but

otherwise cooperative, somewhat anxious, a little obsessive.





Review of Systems:  No CV, , pulmonary, eye, ENT system symptoms on review.





Mental Status Exam:  Patient is oriented to herself and situation.  Speech 

coherent.  Abstraction fair.  Computation impaired.  Language function intact.  

Mood and affect anxious, obsessive.  She was repeatedly asking about discharge 

plans which I addressed with her.  She is otherwise, pleasant, smiling, verbal 

and interactive.  No suicidal or homicidal ideation.





Laboratory Data:  Reviewed.





Impression:  Psychotic disorder unspecified.  Major neurocognitive disorder, 

Alzheimer, vascular with delusion, depression.  Anxiety disorder unspecified.  

Impulse control disorder unspecified.





Plan:  Continue rest psychotropics unchanged.  Reviewed drug interactions, risk-

benefit ratio.  Adjust as clinically indicated.





Assessment:


Vital Signs/I&O:





                                   Vital Signs








  Date Time  Temp Pulse Resp B/P (MAP) Pulse Ox O2 Delivery O2 Flow Rate FiO2


 


4/28/22 07:40  62  142/82    


 


4/28/22 06:31 97.4  20  97 Room Air  














                                    I & O   


 


 4/27/22 4/27/22 4/28/22





 15:00 23:00 07:00


 


Intake Total 720 ml 720 ml 


 


Balance 720 ml 720 ml 











Current Medications:


Meds:





Current Medications








 Medications


  (Trade)  Dose


 Ordered  Sig/Bryant


 Route


 PRN Reason  Start Time


 Stop Time Status Last Admin


Dose Admin


 


 Sertraline HCl


  (Zoloft)  50 mg  DAILY


 PO


   4/28/22 09:00


    4/28/22 07:40











I have reviewed the current psychotropics carefully including drug interactions.

 Risk benefit ratio favors no change other than as noted in my dictated progress

note.





Diagnosis:


Problems:  


(1) Psychotic disorder


(2) Impulse control disorder, unspecified


(3) Anxiety disorder, unspecified


(4) Dementia, vascular, with depression


(5) Dementia, vascular, with delusions


(6) Major neurocognitive disorder


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


(8) Dementia in Alzheimer's disease with delusions











SHELTON MONTOYA MD                 Apr 28, 2022 08:48

## 2022-04-28 NOTE — PDOC
Exam


Note:


Anuj Note:


Please also refer to the separate dictated note~for this date of service 

dictated separately.~Patient seen individually. Discussed the patient with 

Nursing staff reviewed the chart.~Reviewed interim history and current 

functioning. Reviewed vital signs,~Labs/ Radiology~and current medications noted

below. Continue current treatment with the changes noted in the dictated 

addendum note





Assessment:


Vital Signs/I&O:





                                   Vital Signs








  Date Time  Temp Pulse Resp B/P (MAP) Pulse Ox O2 Delivery O2 Flow Rate FiO2


 


4/28/22 20:29  90  161/71    


 


4/28/22 15:41 98.5  18  98   


 


4/28/22 06:31      Room Air  














                                    I & O   


 


 4/27/22 4/27/22 4/28/22





 15:00 23:00 07:00


 


Intake Total 720 ml 720 ml 


 


Balance 720 ml 720 ml 











Current Medications:


Meds:





Current Medications








 Medications


  (Trade)  Dose


 Ordered  Sig/Bryant


 Route


 PRN Reason  Start Time


 Stop Time Status Last Admin


Dose Admin


 


 Albuterol Sulfate


  (Ventolin Hfa


 Inhaler)  2 puff  PRN Q4HRS  PRN


 INH


 wheezing  4/21/22 20:30


    4/27/22 08:54





 


 Amiodarone HCl


  (Cordarone)  200 mg  BID


 PO


   4/21/22 21:00


    4/28/22 20:29





 


 Anastrozole


  (Arimidex)  1 mg  DAILY


 PO


   4/22/22 09:00


    4/28/22 07:41





 


 Donepezil HCl


  (Aricept)  10 mg  HS


 PO


   4/21/22 21:00


    4/28/22 20:29





 


 Duloxetine HCl


  (Cymbalta)  60 mg  DAILY


 PO


   4/22/22 09:00


 4/24/22 21:25 DC 4/24/22 08:07





 


 Gabapentin


  (Neurontin)  900 mg  BID


 PO


   4/21/22 21:00


 4/22/22 09:27 DC  





 


 Acetaminophen/


 Hydrocodone Bitart


  (Lortab 7.5/325)  1 tab  PRN BID  PRN


 PO


 MOD-SEV PAIN  4/21/22 20:00


     





 


 Memantine


  (Namenda)  10 mg  BID


 PO


   4/21/22 21:00


    4/28/22 20:29





 


 Simvastatin


  (Zocor)  20 mg  QHS


 PO


   4/21/22 21:00


    4/28/22 20:29





 


 Ascorbic Acid


  (Vitamin C)  250 mg  DAILY


 PO


   4/22/22 09:00


    4/28/22 07:40





 


 Vitamin D


  (Vitamin D3)  1,000 unit  DAILY


 PO


   4/22/22 09:00


    4/28/22 07:40





 


 Fluoxetine HCl


  (PROzac)  40 mg  DAILY


 PO


   4/22/22 09:00


 4/22/22 18:03 DC 4/22/22 09:50





 


 Cyanocobalamin


  (Vitamin B-12)  1,000 mcg  DAILY


 PO


   4/22/22 09:00


    4/28/22 07:40





 


 Prednisone


  (Prednisone)  5 mg  DAILY


 PO


   4/22/22 09:00


    4/28/22 07:40





 


 Fluticasone/


 Vilanterol


  (Breo Ellipta


 100-25 Mcg)  1 puff  DAILY


 INH


   4/22/22 09:00


    4/28/22 07:41





 


 Acetaminophen


  (Tylenol)  650 mg  PRN Q6HRS  PRN


 PO


 MILD PAIN / TEMP > 100.3'F  4/21/22 20:30


    4/28/22 20:30





 


 Multi-Ingredient


 Ointment


  (Analgesic Balm)  1 jose  PRN QID  PRN


 TP


 MUSCLE PAIN  4/21/22 20:30


     





 


 Al Hydroxide/Mg


 Hydroxide


  (Mylanta Plus Xs)  15 ml  PRN AFTMEALHC  PRN


 PO


 DYSPEPSIA  4/21/22 20:30


     





 


 Magnesium


 Hydroxide


  (Milk Of


 Magnesia)  2,400 mg  PRN QHS  PRN


 PO


 CONSTIPATION  4/21/22 20:30


    4/23/22 15:07





 


 Gabapentin


  (Neurontin)  300 mg  TID


 PO


   4/22/22 09:30


    4/28/22 20:31





 


 Potassium Chloride


  (Klor-Con)  20 meq  TID


 PO


   4/22/22 14:00


 4/22/22 12:28 DC  





 


 Potassium Chloride


  (Klor-Con)  20 meq  DAILY


 PO


   4/22/22 12:30


 4/23/22 15:19 DC  





 


 Donepezil HCl


  (Aricept)  10 mg  HS


 PO


   4/24/22 21:00


 4/24/22 21:25 DC  





 


 Memantine


  (Namenda)  10 mg  BID


 PO


   4/24/22 21:00


 4/24/22 21:23 DC  





 


 Sertraline HCl


  (Zoloft)  25 mg  DAILY


 PO


   4/25/22 09:00


 4/27/22 15:00 DC 4/27/22 08:50





 


 Sertraline HCl


  (Zoloft)  50 mg  DAILY


 PO


   4/28/22 09:00


    4/28/22 07:40





 


 Quetiapine


 Fumarate


  (SEROquel)  12.5 mg  HS


 PO


   4/24/22 21:00


    4/28/22 20:29











Current Medications








 Medications


  (Trade)  Dose


 Ordered  Sig/Bryant


 Route


 PRN Reason  Start Time


 Stop Time Status Last Admin


Dose Admin


 


 Sertraline HCl


  (Zoloft)  50 mg  DAILY


 PO


   4/28/22 09:00


    4/28/22 07:40











I have reviewed the current psychotropics carefully including drug interactions.

 Risk benefit ratio favors no change other than as noted in my dictated progress

note.





Diagnosis:


Problems:  


(1) Psychotic disorder


(2) Impulse control disorder, unspecified


(3) Anxiety disorder, unspecified


(4) Dementia, vascular, with depression


(5) Dementia, vascular, with delusions


(6) Major neurocognitive disorder


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


(8) Dementia in Alzheimer's disease with delusions











SHELTON MONTOYA MD                 Apr 28, 2022 22:06

## 2022-04-29 VITALS — SYSTOLIC BLOOD PRESSURE: 173 MMHG | DIASTOLIC BLOOD PRESSURE: 75 MMHG

## 2022-04-29 VITALS — SYSTOLIC BLOOD PRESSURE: 125 MMHG | DIASTOLIC BLOOD PRESSURE: 69 MMHG

## 2022-04-29 LAB
ALBUMIN SERPL-MCNC: 3.1 G/DL (ref 3.4–5)
ALBUMIN/GLOB SERPL: 1 {RATIO} (ref 1–1.7)
ALP SERPL-CCNC: 87 U/L (ref 46–116)
ALT SERPL-CCNC: 102 U/L (ref 14–59)
ANION GAP SERPL CALC-SCNC: 7 MMOL/L (ref 6–14)
AST SERPL-CCNC: 44 U/L (ref 15–37)
BASOPHILS # BLD AUTO: 0.1 X10^3/UL (ref 0–0.2)
BASOPHILS NFR BLD: 1 % (ref 0–3)
BILIRUB SERPL-MCNC: 0.4 MG/DL (ref 0.2–1)
BUN/CREAT SERPL: 15 (ref 6–20)
CA-I SERPL ISE-MCNC: 17 MG/DL (ref 7–20)
CALCIUM SERPL-MCNC: 9 MG/DL (ref 8.5–10.1)
CHLORIDE SERPL-SCNC: 106 MMOL/L (ref 98–107)
CO2 SERPL-SCNC: 34 MMOL/L (ref 21–32)
CREAT SERPL-MCNC: 1.1 MG/DL (ref 0.6–1)
EOSINOPHIL NFR BLD: 0.2 X10^3/UL (ref 0–0.7)
EOSINOPHIL NFR BLD: 3 % (ref 0–3)
ERYTHROCYTE [DISTWIDTH] IN BLOOD BY AUTOMATED COUNT: 14.1 % (ref 11.5–14.5)
GFR SERPLBLD BASED ON 1.73 SQ M-ARVRAT: 48.2 ML/MIN
GLOBULIN SER-MCNC: 3.2 G/DL (ref 2.2–3.8)
GLUCOSE SERPL-MCNC: 93 MG/DL (ref 70–99)
HCT VFR BLD CALC: 37.1 % (ref 36–47)
HGB BLD-MCNC: 12.3 G/DL (ref 12–15.5)
LYMPHOCYTES # BLD: 2.2 X10^3/UL (ref 1–4.8)
LYMPHOCYTES NFR BLD AUTO: 34 % (ref 24–48)
MCH RBC QN AUTO: 30 PG (ref 25–35)
MCHC RBC AUTO-ENTMCNC: 33 G/DL (ref 31–37)
MCV RBC AUTO: 91 FL (ref 79–100)
MONO #: 0.6 X10^3/UL (ref 0–1.1)
MONOCYTES NFR BLD: 9 % (ref 0–9)
NEUT #: 3.4 X10^3UL (ref 1.8–7.7)
NEUTROPHILS NFR BLD AUTO: 53 % (ref 31–73)
PLATELET # BLD AUTO: 168 X10^3/UL (ref 140–400)
POTASSIUM SERPL-SCNC: 3.5 MMOL/L (ref 3.5–5.1)
PROT SERPL-MCNC: 6.3 G/DL (ref 6.4–8.2)
RBC # BLD AUTO: 4.07 X10^6/UL (ref 3.5–5.4)
SODIUM SERPL-SCNC: 147 MMOL/L (ref 136–145)
WBC # BLD AUTO: 6.4 X10^3/UL (ref 4–11)

## 2022-04-29 RX ADMIN — AMIODARONE HYDROCHLORIDE SCH MG: 200 TABLET ORAL at 20:10

## 2022-04-29 RX ADMIN — OXYCODONE HYDROCHLORIDE AND ACETAMINOPHEN SCH MG: 500 TABLET ORAL at 08:25

## 2022-04-29 RX ADMIN — SERTRALINE HYDROCHLORIDE SCH MG: 50 TABLET ORAL at 08:28

## 2022-04-29 RX ADMIN — GABAPENTIN SCH MG: 300 CAPSULE ORAL at 14:16

## 2022-04-29 RX ADMIN — GABAPENTIN SCH MG: 300 CAPSULE ORAL at 20:10

## 2022-04-29 RX ADMIN — Medication SCH MCG: at 08:26

## 2022-04-29 RX ADMIN — ALBUTEROL SULFATE PRN PUFF: 90 AEROSOL, METERED RESPIRATORY (INHALATION) at 08:29

## 2022-04-29 RX ADMIN — DONEPEZIL HYDROCHLORIDE SCH MG: 10 TABLET ORAL at 20:09

## 2022-04-29 RX ADMIN — SIMVASTATIN SCH MG: 20 TABLET, FILM COATED ORAL at 20:12

## 2022-04-29 RX ADMIN — MEMANTINE HYDROCHLORIDE SCH MG: 10 TABLET ORAL at 20:10

## 2022-04-29 RX ADMIN — GABAPENTIN SCH MG: 300 CAPSULE ORAL at 08:26

## 2022-04-29 RX ADMIN — QUETIAPINE FUMARATE SCH MG: 25 TABLET, FILM COATED ORAL at 20:11

## 2022-04-29 RX ADMIN — MEMANTINE HYDROCHLORIDE SCH MG: 10 TABLET ORAL at 08:25

## 2022-04-29 RX ADMIN — FLUTICASONE FUROATE AND VILANTEROL TRIFENATATE SCH PUFF: 100; 25 POWDER RESPIRATORY (INHALATION) at 09:28

## 2022-04-29 RX ADMIN — VITAMIN D, TAB 1000IU (100/BT) SCH UNIT: 25 TAB at 08:25

## 2022-04-29 RX ADMIN — AMIODARONE HYDROCHLORIDE SCH MG: 200 TABLET ORAL at 08:26

## 2022-04-29 RX ADMIN — ANASTROZOLE SCH MG: 1 TABLET, COATED ORAL at 08:27

## 2022-04-29 NOTE — PDOC
Exam


Note:


Anuj Note:


This note is a late entry for 04/28/2022 covers elements not covered in my 

initial note.





Subjective:  The patient was reviewed at treatment team meeting individually in 

the morning on 04/28/2022 with Shila Duran, Tracie Pedersen, and Evelin Graves (), Juanita, activity therapy, and Barbara SHARMA, discussed 

and reviewed the chart.  The patient slept 5-3/4 hours previous night.  The 

patients DPOA and friend Ed attended the meeting.  I have reviewed records from

Dr. Medina about questionable normal pressure hydrocephalus but apparently the 

final assessment goes hydrocephalus ex vacuo.  She is confused more so in the 

evening with short-term memory deficits.  She is oriented to herself and date.  

She has attended 6 groups, does well in the [facility] group.  In the evening 

discussed with Raya SHARMA.  I met with her at length in her room.  Labs to be 

check on 04/29.  She gets agitated with her roommate.   She had many questions 

about the progression of her dementia, the causation, prognosis addressed this 

with her at great length.  She seemed to comprehend.





Review of Systems:  No CV, , pulmonary, eye, ENT system symptoms on review.





Mental Status Exam:  Patient is oriented to herself and situation.  Speech 

coherent.  Abstraction fair.  Computation impaired.  Language function intact.  

Mood and affect anxious.  Short-term memory is impaired.   No suicidal or 

homicidal ideation.





Laboratory Data:  Reviewed.





Impression:  Psychotic disorder unspecified.  Major neurocognitive disorder, 

Alzheimer, vascular with delusion, depression.  Anxiety disorder unspecified.  

Impulse control disorder unspecified.





Plan:  Continue rest psychotropics unchanged.  Reviewed drug interactions, 

risk-benefit ratio.  Adjust as clinically indicated.





Assessment:


Vital Signs/I&O:





                                   Vital Signs








  Date Time  Temp Pulse Resp B/P (MAP) Pulse Ox O2 Delivery O2 Flow Rate FiO2


 


4/29/22 08:26  72  173/75    


 


4/29/22 07:05 98.3  17  96 Room Air  














                                    I & O   


 


 4/28/22 4/28/22 4/29/22





 15:00 23:00 07:00


 


Intake Total 680 ml 600 ml 


 


Balance 680 ml 600 ml 








Labs:





                                Laboratory Tests








Test


 4/29/22


06:04


 


White Blood Count


 6.4 x10^3/uL


(4.0-11.0)


 


Red Blood Count


 4.07 x10^6/uL


(3.50-5.40)


 


Hemoglobin


 12.3 g/dL


(12.0-15.5)


 


Hematocrit


 37.1 %


(36.0-47.0)


 


Mean Corpuscular Volume


 91 fL ()





 


Mean Corpuscular Hemoglobin 30 pg (25-35)  


 


Mean Corpuscular Hemoglobin


Concent 33 g/dL


(31-37)


 


Red Cell Distribution Width


 14.1 %


(11.5-14.5)


 


Platelet Count


 168 x10^3/uL


(140-400)


 


Neutrophils (%) (Auto) 53 % (31-73)  


 


Lymphocytes (%) (Auto) 34 % (24-48)  


 


Monocytes (%) (Auto) 9 % (0-9)  


 


Eosinophils (%) (Auto) 3 % (0-3)  


 


Basophils (%) (Auto) 1 % (0-3)  


 


Neutrophils # (Auto)


 3.4 x10^3uL


(1.8-7.7)


 


Lymphocytes # (Auto)


 2.2 x10^3/uL


(1.0-4.8)


 


Monocytes # (Auto)


 0.6 x10^3/uL


(0.0-1.1)


 


Eosinophils # (Auto)


 0.2 x10^3/uL


(0.0-0.7)


 


Basophils # (Auto)


 0.1 x10^3/uL


(0.0-0.2)


 


Sodium Level Pending  


 


Potassium Level Pending  


 


Chloride Level Pending  


 


Carbon Dioxide Level


 34 mmol/L


(21-32)  H


 


Anion Gap Pending  


 


Blood Urea Nitrogen


 17 mg/dL


(7-20)


 


Creatinine


 1.1 mg/dL


(0.6-1.0)  H


 


Estimated GFR


(Cockcroft-Gault) 48.2  





 


BUN/Creatinine Ratio 15 (6-20)  


 


Glucose Level


 93 mg/dL


(70-99)


 


Calcium Level


 9.0 mg/dL


(8.5-10.1)


 


Total Bilirubin


 0.4 mg/dL


(0.2-1.0)


 


Aspartate Amino Transferase


(AST) 44 U/L (15-37)


H


 


Alanine Aminotransferase (ALT)


 102 U/L


(14-59)  H


 


Alkaline Phosphatase


 87 U/L


()


 


Total Protein


 6.3 g/dL


(6.4-8.2)  L


 


Albumin


 3.1 g/dL


(3.4-5.0)  L


 


Albumin/Globulin Ratio 1.0 (1.0-1.7)  











Current Medications:


Meds:





Current Medications








 Medications


  (Trade)  Dose


 Ordered  Sig/Bryant


 Route


 PRN Reason  Start Time


 Stop Time Status Last Admin


Dose Admin


 


 Sertraline HCl


  (Zoloft)  50 mg  DAILY


 PO


   4/28/22 09:00


    4/29/22 08:28











I have reviewed the current psychotropics carefully including drug interactions.

 Risk benefit ratio favors no change other than as noted in my dictated progress

note.





Diagnosis:


Problems:  


(1) Psychotic disorder


(2) Impulse control disorder, unspecified


(3) Anxiety disorder, unspecified


(4) Dementia, vascular, with depression


(5) Dementia, vascular, with delusions


(6) Major neurocognitive disorder


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


(8) Dementia in Alzheimer's disease with delusions











SHELTON MONTOYA MD                 Apr 29, 2022 08:55

## 2022-04-29 NOTE — PDOC
Exam


Note:


Anuj Note:


Please also refer to the separate dictated note~for this date of service 

dictated separately.~Patient seen individually. Discussed the patient with 

Nursing staff reviewed the chart.~Reviewed interim history and current 

functioning. Reviewed vital signs,~Labs/ Radiology~and current medications noted

below. Continue current treatment with the changes noted in the dictated 

addendum note





Assessment:


Vital Signs/I&O:





                                   Vital Signs








  Date Time  Temp Pulse Resp B/P (MAP) Pulse Ox O2 Delivery O2 Flow Rate FiO2


 


4/29/22 20:10  60  125/69    


 


4/29/22 17:21 98.8  16  95   


 


4/29/22 07:05      Room Air  














                                    I & O   


 


 4/28/22 4/28/22 4/29/22





 15:00 23:00 07:00


 


Intake Total 680 ml 600 ml 


 


Balance 680 ml 600 ml 








Labs:





                                Laboratory Tests








Test


 4/29/22


06:04


 


White Blood Count


 6.4 x10^3/uL


(4.0-11.0)


 


Red Blood Count


 4.07 x10^6/uL


(3.50-5.40)


 


Hemoglobin


 12.3 g/dL


(12.0-15.5)


 


Hematocrit


 37.1 %


(36.0-47.0)


 


Mean Corpuscular Volume


 91 fL ()





 


Mean Corpuscular Hemoglobin 30 pg (25-35)  


 


Mean Corpuscular Hemoglobin


Concent 33 g/dL


(31-37)


 


Red Cell Distribution Width


 14.1 %


(11.5-14.5)


 


Platelet Count


 168 x10^3/uL


(140-400)


 


Neutrophils (%) (Auto) 53 % (31-73)  


 


Lymphocytes (%) (Auto) 34 % (24-48)  


 


Monocytes (%) (Auto) 9 % (0-9)  


 


Eosinophils (%) (Auto) 3 % (0-3)  


 


Basophils (%) (Auto) 1 % (0-3)  


 


Neutrophils # (Auto)


 3.4 x10^3uL


(1.8-7.7)


 


Lymphocytes # (Auto)


 2.2 x10^3/uL


(1.0-4.8)


 


Monocytes # (Auto)


 0.6 x10^3/uL


(0.0-1.1)


 


Eosinophils # (Auto)


 0.2 x10^3/uL


(0.0-0.7)


 


Basophils # (Auto)


 0.1 x10^3/uL


(0.0-0.2)


 


Sodium Level


 147 mmol/L


(136-145)  H


 


Potassium Level


 3.5 mmol/L


(3.5-5.1)


 


Chloride Level


 106 mmol/L


()


 


Carbon Dioxide Level


 34 mmol/L


(21-32)  H


 


Anion Gap 7 (6-14)  


 


Blood Urea Nitrogen


 17 mg/dL


(7-20)


 


Creatinine


 1.1 mg/dL


(0.6-1.0)  H


 


Estimated GFR


(Cockcroft-Gault) 48.2  





 


BUN/Creatinine Ratio 15 (6-20)  


 


Glucose Level


 93 mg/dL


(70-99)


 


Calcium Level


 9.0 mg/dL


(8.5-10.1)


 


Total Bilirubin


 0.4 mg/dL


(0.2-1.0)


 


Aspartate Amino Transferase


(AST) 44 U/L (15-37)


H


 


Alanine Aminotransferase (ALT)


 102 U/L


(14-59)  H


 


Alkaline Phosphatase


 87 U/L


()


 


Total Protein


 6.3 g/dL


(6.4-8.2)  L


 


Albumin


 3.1 g/dL


(3.4-5.0)  L


 


Albumin/Globulin Ratio 1.0 (1.0-1.7)  











Current Medications:


Meds:





Laboratory Tests








Test


 4/29/22


06:04


 


White Blood Count 6.4 x10^3/uL 


 


Red Blood Count 4.07 x10^6/uL 


 


Hemoglobin 12.3 g/dL 


 


Hematocrit 37.1 % 


 


Mean Corpuscular Volume 91 fL 


 


Mean Corpuscular Hemoglobin 30 pg 


 


Mean Corpuscular Hemoglobin


Concent 33 g/dL 





 


Red Cell Distribution Width 14.1 % 


 


Platelet Count 168 x10^3/uL 


 


Neutrophils (%) (Auto) 53 % 


 


Lymphocytes (%) (Auto) 34 % 


 


Monocytes (%) (Auto) 9 % 


 


Eosinophils (%) (Auto) 3 % 


 


Basophils (%) (Auto) 1 % 


 


Neutrophils # (Auto) 3.4 x10^3uL 


 


Lymphocytes # (Auto) 2.2 x10^3/uL 


 


Monocytes # (Auto) 0.6 x10^3/uL 


 


Eosinophils # (Auto) 0.2 x10^3/uL 


 


Basophils # (Auto) 0.1 x10^3/uL 


 


Sodium Level 147 mmol/L 


 


Potassium Level 3.5 mmol/L 


 


Chloride Level 106 mmol/L 


 


Carbon Dioxide Level 34 mmol/L 


 


Anion Gap 7 


 


Blood Urea Nitrogen 17 mg/dL 


 


Creatinine 1.1 mg/dL 


 


Estimated GFR


(Cockcroft-Gault) 48.2 





 


BUN/Creatinine Ratio 15 


 


Glucose Level 93 mg/dL 


 


Calcium Level 9.0 mg/dL 


 


Total Bilirubin 0.4 mg/dL 


 


Aspartate Amino Transf


(AST/SGOT) 44 U/L 





 


Alanine Aminotransferase


(ALT/SGPT) 102 U/L 





 


Alkaline Phosphatase 87 U/L 


 


Total Protein 6.3 g/dL 


 


Albumin 3.1 g/dL 


 


Albumin/Globulin Ratio 1.0 








Current Medications








 Medications


  (Trade)  Dose


 Ordered  Sig/Bryant


 Route


 PRN Reason  Start Time


 Stop Time Status Last Admin


Dose Admin


 


 Albuterol Sulfate


  (Ventolin Hfa


 Inhaler)  2 puff  PRN Q4HRS  PRN


 INH


 wheezing  4/21/22 20:30


    4/29/22 08:29





 


 Amiodarone HCl


  (Cordarone)  200 mg  BID


 PO


   4/21/22 21:00


    4/29/22 20:10





 


 Anastrozole


  (Arimidex)  1 mg  DAILY


 PO


   4/22/22 09:00


    4/29/22 08:27





 


 Donepezil HCl


  (Aricept)  10 mg  HS


 PO


   4/21/22 21:00


    4/29/22 20:09





 


 Duloxetine HCl


  (Cymbalta)  60 mg  DAILY


 PO


   4/22/22 09:00


 4/24/22 21:25 DC 4/24/22 08:07





 


 Gabapentin


  (Neurontin)  900 mg  BID


 PO


   4/21/22 21:00


 4/22/22 09:27 DC  





 


 Acetaminophen/


 Hydrocodone Bitart


  (Lortab 7.5/325)  1 tab  PRN BID  PRN


 PO


 MOD-SEV PAIN  4/21/22 20:00


     





 


 Memantine


  (Namenda)  10 mg  BID


 PO


   4/21/22 21:00


    4/29/22 20:10





 


 Simvastatin


  (Zocor)  20 mg  QHS


 PO


   4/21/22 21:00


    4/29/22 20:12





 


 Ascorbic Acid


  (Vitamin C)  250 mg  DAILY


 PO


   4/22/22 09:00


    4/29/22 08:25





 


 Vitamin D


  (Vitamin D3)  1,000 unit  DAILY


 PO


   4/22/22 09:00


    4/29/22 08:25





 


 Fluoxetine HCl


  (PROzac)  40 mg  DAILY


 PO


   4/22/22 09:00


 4/22/22 18:03 DC 4/22/22 09:50





 


 Cyanocobalamin


  (Vitamin B-12)  1,000 mcg  DAILY


 PO


   4/22/22 09:00


    4/29/22 08:26





 


 Prednisone


  (Prednisone)  5 mg  DAILY


 PO


   4/22/22 09:00


    4/29/22 08:28





 


 Fluticasone/


 Vilanterol


  (Breo Ellipta


 100-25 Mcg)  1 puff  DAILY


 INH


   4/22/22 09:00


    4/29/22 09:28





 


 Acetaminophen


  (Tylenol)  650 mg  PRN Q6HRS  PRN


 PO


 MILD PAIN / TEMP > 100.3'F  4/21/22 20:30


    4/28/22 20:30





 


 Multi-Ingredient


 Ointment


  (Analgesic Balm)  1 jose  PRN QID  PRN


 TP


 MUSCLE PAIN  4/21/22 20:30


     





 


 Al Hydroxide/Mg


 Hydroxide


  (Mylanta Plus Xs)  15 ml  PRN AFTMEALHC  PRN


 PO


 DYSPEPSIA  4/21/22 20:30


     





 


 Magnesium


 Hydroxide


  (Milk Of


 Magnesia)  2,400 mg  PRN QHS  PRN


 PO


 CONSTIPATION  4/21/22 20:30


    4/23/22 15:07





 


 Gabapentin


  (Neurontin)  300 mg  TID


 PO


   4/22/22 09:30


    4/29/22 20:10





 


 Potassium Chloride


  (Klor-Con)  20 meq  TID


 PO


   4/22/22 14:00


 4/22/22 12:28 DC  





 


 Potassium Chloride


  (Klor-Con)  20 meq  DAILY


 PO


   4/22/22 12:30


 4/23/22 15:19 DC  





 


 Donepezil HCl


  (Aricept)  10 mg  HS


 PO


   4/24/22 21:00


 4/24/22 21:25 DC  





 


 Memantine


  (Namenda)  10 mg  BID


 PO


   4/24/22 21:00


 4/24/22 21:23 DC  





 


 Sertraline HCl


  (Zoloft)  25 mg  DAILY


 PO


   4/25/22 09:00


 4/27/22 15:00 DC 4/27/22 08:50





 


 Sertraline HCl


  (Zoloft)  50 mg  DAILY


 PO


   4/28/22 09:00


    4/29/22 08:28





 


 Quetiapine


 Fumarate


  (SEROquel)  12.5 mg  HS


 PO


   4/24/22 21:00


    4/29/22 20:11











I have reviewed the current psychotropics carefully including drug interactions.

 Risk benefit ratio favors no change other than as noted in my dictated progress

note.





Diagnosis:


Problems:  


(1) Psychotic disorder


(2) Impulse control disorder, unspecified


(3) Anxiety disorder, unspecified


(4) Dementia, vascular, with depression


(5) Dementia, vascular, with delusions


(6) Major neurocognitive disorder


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


(8) Dementia in Alzheimer's disease with delusions











SHELTON MONTOYA MD                 Apr 29, 2022 21:37

## 2022-04-30 VITALS — DIASTOLIC BLOOD PRESSURE: 83 MMHG | SYSTOLIC BLOOD PRESSURE: 110 MMHG

## 2022-04-30 VITALS — DIASTOLIC BLOOD PRESSURE: 80 MMHG | SYSTOLIC BLOOD PRESSURE: 166 MMHG

## 2022-04-30 RX ADMIN — Medication SCH MCG: at 08:21

## 2022-04-30 RX ADMIN — ANASTROZOLE SCH MG: 1 TABLET, COATED ORAL at 08:23

## 2022-04-30 RX ADMIN — AMIODARONE HYDROCHLORIDE SCH MG: 200 TABLET ORAL at 20:45

## 2022-04-30 RX ADMIN — GABAPENTIN SCH MG: 300 CAPSULE ORAL at 12:22

## 2022-04-30 RX ADMIN — DONEPEZIL HYDROCHLORIDE SCH MG: 10 TABLET ORAL at 20:45

## 2022-04-30 RX ADMIN — QUETIAPINE FUMARATE SCH MG: 25 TABLET, FILM COATED ORAL at 20:44

## 2022-04-30 RX ADMIN — MEMANTINE HYDROCHLORIDE SCH MG: 10 TABLET ORAL at 20:45

## 2022-04-30 RX ADMIN — GABAPENTIN SCH MG: 300 CAPSULE ORAL at 08:22

## 2022-04-30 RX ADMIN — FLUTICASONE FUROATE AND VILANTEROL TRIFENATATE SCH PUFF: 100; 25 POWDER RESPIRATORY (INHALATION) at 08:20

## 2022-04-30 RX ADMIN — AMIODARONE HYDROCHLORIDE SCH MG: 200 TABLET ORAL at 08:22

## 2022-04-30 RX ADMIN — MEMANTINE HYDROCHLORIDE SCH MG: 10 TABLET ORAL at 08:21

## 2022-04-30 RX ADMIN — SERTRALINE HYDROCHLORIDE SCH MG: 50 TABLET ORAL at 08:22

## 2022-04-30 RX ADMIN — OXYCODONE HYDROCHLORIDE AND ACETAMINOPHEN SCH MG: 500 TABLET ORAL at 08:22

## 2022-04-30 RX ADMIN — VITAMIN D, TAB 1000IU (100/BT) SCH UNIT: 25 TAB at 08:21

## 2022-04-30 RX ADMIN — MAGNESIUM HYDROXIDE PRN MG: 400 SUSPENSION ORAL at 21:14

## 2022-04-30 RX ADMIN — GABAPENTIN SCH MG: 300 CAPSULE ORAL at 20:45

## 2022-04-30 RX ADMIN — SIMVASTATIN SCH MG: 20 TABLET, FILM COATED ORAL at 20:44

## 2022-04-30 NOTE — PDOC
Exam


Note:


Anuj Note:


This note is a late entry for 04/29/2022 covers elements not covered in my 

initial note.





Subjective:  The patient was seen individually on 04/29/2022, discussed and 

reviewed the chart with Kwaku SHARMA.  The patient slept 7-1/4 hours previous 

night.  She is somewhat more social and smiling.  I met with her individually.  

Per nursing report she has been fairly appropriate.  She is obsessing somewhat 

about discharge and I addressed this with her for Monday.





Review of Systems:  No CV, , pulmonary, eye, ENT system symptoms on review.





Mental Status Exam:  Patient is oriented to herself and situation.  Speech 

coherent.  Abstraction fair.  Computation impaired.  Language function intact.  

Mood and affect anxious.  Short-term memory is impaired.   No suicidal or 

homicidal ideation.





Laboratory Data:  Reviewed.





Impression:  Psychotic disorder unspecified.  Major neurocognitive disorder, 

Alzheimer, vascular with delusion, depression.  Anxiety disorder unspecified.  

Impulse control disorder unspecified.





Plan:  Continue rest psychotropics unchanged.  Reviewed drug interactions, risk-

benefit ratio.  Adjust as clinically indicated.





Assessment:


Vital Signs/I&O:





                                   Vital Signs








  Date Time  Temp Pulse Resp B/P (MAP) Pulse Ox O2 Delivery O2 Flow Rate FiO2


 


4/30/22 08:22  62  119/61    


 


4/30/22 06:20 97.5  16  96   


 


4/29/22 07:05      Room Air  














                                    I & O   


 


 4/29/22 4/29/22 4/30/22





 15:00 23:00 07:00


 


Intake Total 590 ml 1080 ml 


 


Balance 590 ml 1080 ml 











Current Medications:


I have reviewed the current psychotropics carefully including drug interactions.

 Risk benefit ratio favors no change other than as noted in my dictated progress

note.





Diagnosis:


Problems:  


(1) Psychotic disorder


(2) Impulse control disorder, unspecified


(3) Anxiety disorder, unspecified


(4) Dementia, vascular, with depression


(5) Dementia, vascular, with delusions


(6) Major neurocognitive disorder


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


(8) Dementia in Alzheimer's disease with delusions











SHELTON MONTOYA MD                 Apr 30, 2022 09:05

## 2022-04-30 NOTE — PDOC
Exam


Note:


Anuj Note:


Please also refer to the separate dictated note~for this date of service 

dictated separately.~Patient seen individually. Discussed the patient with 

Nursing staff reviewed the chart.~Reviewed interim history and current 

functioning. Reviewed vital signs,~Labs/ Radiology~and current medications noted

below. Continue current treatment with the changes noted in the dictated 

addendum note





Assessment:


Vital Signs/I&O:





                                   Vital Signs








  Date Time  Temp Pulse Resp B/P (MAP) Pulse Ox O2 Delivery O2 Flow Rate FiO2


 


4/30/22 20:45  69  110/83    


 


4/30/22 15:51 98.8  18  98   


 


4/29/22 07:05      Room Air  














                                    I & O   


 


 4/29/22 4/29/22 4/30/22





 15:00 23:00 07:00


 


Intake Total 590 ml 1080 ml 


 


Balance 590 ml 1080 ml 











Current Medications:


Meds:





Current Medications








 Medications


  (Trade)  Dose


 Ordered  Sig/Bryant


 Route


 PRN Reason  Start Time


 Stop Time Status Last Admin


Dose Admin


 


 Albuterol Sulfate


  (Ventolin Hfa


 Inhaler)  2 puff  PRN Q4HRS  PRN


 INH


 wheezing  4/21/22 20:30


    4/29/22 08:29





 


 Amiodarone HCl


  (Cordarone)  200 mg  BID


 PO


   4/21/22 21:00


    4/30/22 20:45





 


 Anastrozole


  (Arimidex)  1 mg  DAILY


 PO


   4/22/22 09:00


    4/30/22 08:23





 


 Donepezil HCl


  (Aricept)  10 mg  HS


 PO


   4/21/22 21:00


    4/30/22 20:45





 


 Duloxetine HCl


  (Cymbalta)  60 mg  DAILY


 PO


   4/22/22 09:00


 4/24/22 21:25 DC 4/24/22 08:07





 


 Gabapentin


  (Neurontin)  900 mg  BID


 PO


   4/21/22 21:00


 4/22/22 09:27 DC  





 


 Acetaminophen/


 Hydrocodone Bitart


  (Lortab 7.5/325)  1 tab  PRN BID  PRN


 PO


 MOD-SEV PAIN  4/21/22 20:00


     





 


 Memantine


  (Namenda)  10 mg  BID


 PO


   4/21/22 21:00


    4/30/22 20:45





 


 Simvastatin


  (Zocor)  20 mg  QHS


 PO


   4/21/22 21:00


    4/30/22 20:44





 


 Ascorbic Acid


  (Vitamin C)  250 mg  DAILY


 PO


   4/22/22 09:00


    4/30/22 08:22





 


 Vitamin D


  (Vitamin D3)  1,000 unit  DAILY


 PO


   4/22/22 09:00


    4/30/22 08:21





 


 Fluoxetine HCl


  (PROzac)  40 mg  DAILY


 PO


   4/22/22 09:00


 4/22/22 18:03 DC 4/22/22 09:50





 


 Cyanocobalamin


  (Vitamin B-12)  1,000 mcg  DAILY


 PO


   4/22/22 09:00


    4/30/22 08:21





 


 Prednisone


  (Prednisone)  5 mg  DAILY


 PO


   4/22/22 09:00


    4/30/22 08:22





 


 Fluticasone/


 Vilanterol


  (Breo Ellipta


 100-25 Mcg)  1 puff  DAILY


 INH


   4/22/22 09:00


    4/30/22 08:20





 


 Acetaminophen


  (Tylenol)  650 mg  PRN Q6HRS  PRN


 PO


 MILD PAIN / TEMP > 100.3'F  4/21/22 20:30


    4/28/22 20:30





 


 Multi-Ingredient


 Ointment


  (Analgesic Balm)  1 jose  PRN QID  PRN


 TP


 MUSCLE PAIN  4/21/22 20:30


     





 


 Al Hydroxide/Mg


 Hydroxide


  (Mylanta Plus Xs)  15 ml  PRN AFTMEALHC  PRN


 PO


 DYSPEPSIA  4/21/22 20:30


     





 


 Magnesium


 Hydroxide


  (Milk Of


 Magnesia)  2,400 mg  PRN QHS  PRN


 PO


 CONSTIPATION  4/21/22 20:30


    4/30/22 21:14





 


 Gabapentin


  (Neurontin)  300 mg  TID


 PO


   4/22/22 09:30


    4/30/22 20:45





 


 Potassium Chloride


  (Klor-Con)  20 meq  TID


 PO


   4/22/22 14:00


 4/22/22 12:28 DC  





 


 Potassium Chloride


  (Klor-Con)  20 meq  DAILY


 PO


   4/22/22 12:30


 4/23/22 15:19 DC  





 


 Donepezil HCl


  (Aricept)  10 mg  HS


 PO


   4/24/22 21:00


 4/24/22 21:25 DC  





 


 Memantine


  (Namenda)  10 mg  BID


 PO


   4/24/22 21:00


 4/24/22 21:23 DC  





 


 Sertraline HCl


  (Zoloft)  25 mg  DAILY


 PO


   4/25/22 09:00


 4/27/22 15:00 DC 4/27/22 08:50





 


 Sertraline HCl


  (Zoloft)  50 mg  DAILY


 PO


   4/28/22 09:00


    4/30/22 08:22





 


 Quetiapine


 Fumarate


  (SEROquel)  12.5 mg  HS


 PO


   4/24/22 21:00


    4/30/22 20:44





 


 Artificial Tears


  (Refresh Classic)  1 drop  PRN Q15MIN  PRN


 OU


 DRY EYE  4/30/22 09:45


    4/30/22 12:13











Current Medications








 Medications


  (Trade)  Dose


 Ordered  Sig/Bryant


 Route


 PRN Reason  Start Time


 Stop Time Status Last Admin


Dose Admin


 


 Artificial Tears


  (Refresh Classic)  1 drop  PRN Q15MIN  PRN


 OU


 DRY EYE  4/30/22 09:45


    4/30/22 12:13











I have reviewed the current psychotropics carefully including drug interactions.

 Risk benefit ratio favors no change other than as noted in my dictated progress

note.





Diagnosis:


Problems:  


(1) Psychotic disorder


(2) Impulse control disorder, unspecified


(3) Anxiety disorder, unspecified


(4) Dementia, vascular, with depression


(5) Dementia, vascular, with delusions


(6) Major neurocognitive disorder


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


(8) Dementia in Alzheimer's disease with delusions











SHELTON MONTOYA MD                 Apr 30, 2022 21:55

## 2022-05-01 VITALS — DIASTOLIC BLOOD PRESSURE: 71 MMHG | SYSTOLIC BLOOD PRESSURE: 133 MMHG

## 2022-05-01 VITALS — SYSTOLIC BLOOD PRESSURE: 133 MMHG | DIASTOLIC BLOOD PRESSURE: 71 MMHG

## 2022-05-01 VITALS — DIASTOLIC BLOOD PRESSURE: 73 MMHG | SYSTOLIC BLOOD PRESSURE: 162 MMHG

## 2022-05-01 RX ADMIN — QUETIAPINE FUMARATE SCH MG: 25 TABLET, FILM COATED ORAL at 20:27

## 2022-05-01 RX ADMIN — FLUTICASONE FUROATE AND VILANTEROL TRIFENATATE SCH PUFF: 100; 25 POWDER RESPIRATORY (INHALATION) at 07:42

## 2022-05-01 RX ADMIN — VITAMIN D, TAB 1000IU (100/BT) SCH UNIT: 25 TAB at 07:40

## 2022-05-01 RX ADMIN — SERTRALINE HYDROCHLORIDE SCH MG: 50 TABLET ORAL at 07:40

## 2022-05-01 RX ADMIN — GABAPENTIN SCH MG: 300 CAPSULE ORAL at 12:53

## 2022-05-01 RX ADMIN — MEMANTINE HYDROCHLORIDE SCH MG: 10 TABLET ORAL at 07:41

## 2022-05-01 RX ADMIN — GABAPENTIN SCH MG: 300 CAPSULE ORAL at 20:26

## 2022-05-01 RX ADMIN — MEMANTINE HYDROCHLORIDE SCH MG: 10 TABLET ORAL at 20:25

## 2022-05-01 RX ADMIN — AMIODARONE HYDROCHLORIDE SCH MG: 200 TABLET ORAL at 07:40

## 2022-05-01 RX ADMIN — OXYCODONE HYDROCHLORIDE AND ACETAMINOPHEN SCH MG: 500 TABLET ORAL at 07:41

## 2022-05-01 RX ADMIN — DONEPEZIL HYDROCHLORIDE SCH MG: 10 TABLET ORAL at 20:26

## 2022-05-01 RX ADMIN — SIMVASTATIN SCH MG: 20 TABLET, FILM COATED ORAL at 20:26

## 2022-05-01 RX ADMIN — Medication SCH MCG: at 07:41

## 2022-05-01 RX ADMIN — AMIODARONE HYDROCHLORIDE SCH MG: 200 TABLET ORAL at 20:26

## 2022-05-01 RX ADMIN — ANASTROZOLE SCH MG: 1 TABLET, COATED ORAL at 07:42

## 2022-05-01 RX ADMIN — GABAPENTIN SCH MG: 300 CAPSULE ORAL at 07:40

## 2022-05-01 NOTE — PDOC
Exam


Note:


Anuj Note:


Please also refer to the separate dictated note~for this date of service 

dictated separately.~Patient seen individually. Discussed the patient with 

Nursing staff reviewed the chart.~Reviewed interim history and current 

functioning. Reviewed vital signs,~Labs/ Radiology~and current medications noted

below. Continue current treatment with the changes noted in the dictated 

addendum note





Assessment:


Vital Signs/I&O:





                                   Vital Signs








  Date Time  Temp Pulse Resp B/P (MAP) Pulse Ox O2 Delivery O2 Flow Rate FiO2


 


5/1/22 20:26  64  133/71    


 


5/1/22 15:56 98.6  16  95   


 


5/1/22 15:52      Room Air  














                                    I & O   


 


 4/30/22 4/30/22 5/1/22





 15:00 23:00 07:00


 


Intake Total 960 ml 480 ml 


 


Balance 960 ml 480 ml 











Current Medications:


Meds:





Current Medications








 Medications


  (Trade)  Dose


 Ordered  Sig/Bryant


 Route


 PRN Reason  Start Time


 Stop Time Status Last Admin


Dose Admin


 


 Albuterol Sulfate


  (Ventolin Hfa


 Inhaler)  2 puff  PRN Q4HRS  PRN


 INH


 wheezing  4/21/22 20:30


    4/29/22 08:29





 


 Amiodarone HCl


  (Cordarone)  200 mg  BID


 PO


   4/21/22 21:00


    5/1/22 20:26





 


 Anastrozole


  (Arimidex)  1 mg  DAILY


 PO


   4/22/22 09:00


    5/1/22 07:42





 


 Donepezil HCl


  (Aricept)  10 mg  HS


 PO


   4/21/22 21:00


    5/1/22 20:26





 


 Duloxetine HCl


  (Cymbalta)  60 mg  DAILY


 PO


   4/22/22 09:00


 4/24/22 21:25 DC 4/24/22 08:07





 


 Gabapentin


  (Neurontin)  900 mg  BID


 PO


   4/21/22 21:00


 4/22/22 09:27 DC  





 


 Acetaminophen/


 Hydrocodone Bitart


  (Lortab 7.5/325)  1 tab  PRN BID  PRN


 PO


 MOD-SEV PAIN  4/21/22 20:00


     





 


 Memantine


  (Namenda)  10 mg  BID


 PO


   4/21/22 21:00


    5/1/22 20:25





 


 Simvastatin


  (Zocor)  20 mg  QHS


 PO


   4/21/22 21:00


    5/1/22 20:26





 


 Ascorbic Acid


  (Vitamin C)  250 mg  DAILY


 PO


   4/22/22 09:00


    5/1/22 07:41





 


 Vitamin D


  (Vitamin D3)  1,000 unit  DAILY


 PO


   4/22/22 09:00


    5/1/22 07:40





 


 Fluoxetine HCl


  (PROzac)  40 mg  DAILY


 PO


   4/22/22 09:00


 4/22/22 18:03 DC 4/22/22 09:50





 


 Cyanocobalamin


  (Vitamin B-12)  1,000 mcg  DAILY


 PO


   4/22/22 09:00


    5/1/22 07:41





 


 Prednisone


  (Prednisone)  5 mg  DAILY


 PO


   4/22/22 09:00


    5/1/22 07:41





 


 Fluticasone/


 Vilanterol


  (Breo Ellipta


 100-25 Mcg)  1 puff  DAILY


 INH


   4/22/22 09:00


    5/1/22 07:42





 


 Acetaminophen


  (Tylenol)  650 mg  PRN Q6HRS  PRN


 PO


 MILD PAIN / TEMP > 100.3'F  4/21/22 20:30


    4/28/22 20:30





 


 Multi-Ingredient


 Ointment


  (Analgesic Balm)  1 jose  PRN QID  PRN


 TP


 MUSCLE PAIN  4/21/22 20:30


     





 


 Al Hydroxide/Mg


 Hydroxide


  (Mylanta Plus Xs)  15 ml  PRN AFTMEALHC  PRN


 PO


 DYSPEPSIA  4/21/22 20:30


     





 


 Magnesium


 Hydroxide


  (Milk Of


 Magnesia)  2,400 mg  PRN QHS  PRN


 PO


 CONSTIPATION  4/21/22 20:30


    4/30/22 21:14





 


 Gabapentin


  (Neurontin)  300 mg  TID


 PO


   4/22/22 09:30


    5/1/22 20:26





 


 Potassium Chloride


  (Klor-Con)  20 meq  TID


 PO


   4/22/22 14:00


 4/22/22 12:28 DC  





 


 Potassium Chloride


  (Klor-Con)  20 meq  DAILY


 PO


   4/22/22 12:30


 4/23/22 15:19 DC  





 


 Donepezil HCl


  (Aricept)  10 mg  HS


 PO


   4/24/22 21:00


 4/24/22 21:25 DC  





 


 Memantine


  (Namenda)  10 mg  BID


 PO


   4/24/22 21:00


 4/24/22 21:23 DC  





 


 Sertraline HCl


  (Zoloft)  25 mg  DAILY


 PO


   4/25/22 09:00


 4/27/22 15:00 DC 4/27/22 08:50





 


 Sertraline HCl


  (Zoloft)  50 mg  DAILY


 PO


   4/28/22 09:00


    5/1/22 07:40





 


 Quetiapine


 Fumarate


  (SEROquel)  12.5 mg  HS


 PO


   4/24/22 21:00


    5/1/22 20:27





 


 Artificial Tears


  (Refresh Classic)  1 drop  PRN Q15MIN  PRN


 OU


 DRY EYE  4/30/22 09:45


    4/30/22 12:13











I have reviewed the current psychotropics carefully including drug interactions.

 Risk benefit ratio favors no change other than as noted in my dictated progress

note.





Diagnosis:


Problems:  


(1) Psychotic disorder


(2) Impulse control disorder, unspecified


(3) Anxiety disorder, unspecified


(4) Dementia, vascular, with depression


(5) Dementia, vascular, with delusions


(6) Major neurocognitive disorder


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


(8) Dementia in Alzheimer's disease with delusions











SHELTON MONTOYA MD                  May 1, 2022 21:35

## 2022-05-02 VITALS — DIASTOLIC BLOOD PRESSURE: 57 MMHG | SYSTOLIC BLOOD PRESSURE: 158 MMHG

## 2022-05-02 VITALS — SYSTOLIC BLOOD PRESSURE: 158 MMHG | DIASTOLIC BLOOD PRESSURE: 57 MMHG

## 2022-05-02 RX ADMIN — FLUTICASONE FUROATE AND VILANTEROL TRIFENATATE SCH PUFF: 100; 25 POWDER RESPIRATORY (INHALATION) at 07:54

## 2022-05-02 RX ADMIN — SERTRALINE HYDROCHLORIDE SCH MG: 50 TABLET ORAL at 07:53

## 2022-05-02 RX ADMIN — AMIODARONE HYDROCHLORIDE SCH MG: 200 TABLET ORAL at 07:53

## 2022-05-02 RX ADMIN — GABAPENTIN SCH MG: 300 CAPSULE ORAL at 07:53

## 2022-05-02 RX ADMIN — MEMANTINE HYDROCHLORIDE SCH MG: 10 TABLET ORAL at 07:56

## 2022-05-02 RX ADMIN — ACETAMINOPHEN PRN MG: 325 TABLET, FILM COATED ORAL at 07:53

## 2022-05-02 RX ADMIN — OXYCODONE HYDROCHLORIDE AND ACETAMINOPHEN SCH MG: 500 TABLET ORAL at 07:53

## 2022-05-02 RX ADMIN — ANASTROZOLE SCH MG: 1 TABLET, COATED ORAL at 07:56

## 2022-05-02 RX ADMIN — VITAMIN D, TAB 1000IU (100/BT) SCH UNIT: 25 TAB at 07:52

## 2022-05-02 RX ADMIN — Medication SCH MCG: at 07:53

## 2022-05-02 NOTE — PDOC
Exam


Note:


Anuj Note:


Please also refer to the separate dictated note~for this date of service 

dictated separately.~Patient seen individually. Discussed the patient with 

Nursing staff reviewed the chart.~Reviewed interim history and current 

functioning. Reviewed vital signs,~Labs/ Radiology~and current medications noted

below. Continue current treatment with the changes noted in the dictated 

addendum note





Assessment:


Vital Signs/I&O:





                                   Vital Signs








  Date Time  Temp Pulse Resp B/P (MAP) Pulse Ox O2 Delivery O2 Flow Rate FiO2


 


5/2/22 07:53  63  158/57    


 


5/2/22 06:14 97.5  16  96   


 


5/1/22 15:52      Room Air  














                                    I & O   


 


 5/1/22 5/1/22 5/2/22





 15:00 23:00 07:00


 


Intake Total 560 ml 720 ml 


 


Balance 560 ml 720 ml 











Current Medications:


Meds:





Current Medications








 Medications


  (Trade)  Dose


 Ordered  Sig/Bryant


 Route


 PRN Reason  Start Time


 Stop Time Status Last Admin


Dose Admin


 


 Albuterol Sulfate


  (Ventolin Hfa


 Inhaler)  2 puff  PRN Q4HRS  PRN


 INH


 wheezing  4/21/22 20:30


 5/2/22 09:51 DC 4/29/22 08:29





 


 Amiodarone HCl


  (Cordarone)  200 mg  BID


 PO


   4/21/22 21:00


 5/2/22 09:51 DC 5/2/22 07:53





 


 Anastrozole


  (Arimidex)  1 mg  DAILY


 PO


   4/22/22 09:00


 5/2/22 09:51 DC 5/2/22 07:56





 


 Donepezil HCl


  (Aricept)  10 mg  HS


 PO


   4/21/22 21:00


 5/2/22 09:51 DC 5/1/22 20:26





 


 Duloxetine HCl


  (Cymbalta)  60 mg  DAILY


 PO


   4/22/22 09:00


 4/24/22 21:25 DC 4/24/22 08:07





 


 Gabapentin


  (Neurontin)  900 mg  BID


 PO


   4/21/22 21:00


 4/22/22 09:27 DC  





 


 Acetaminophen/


 Hydrocodone Bitart


  (Lortab 7.5/325)  1 tab  PRN BID  PRN


 PO


 MOD-SEV PAIN  4/21/22 20:00


 5/2/22 09:51 DC  





 


 Memantine


  (Namenda)  10 mg  BID


 PO


   4/21/22 21:00


 5/2/22 09:51 DC 5/2/22 07:56





 


 Simvastatin


  (Zocor)  20 mg  QHS


 PO


   4/21/22 21:00


 5/2/22 09:51 DC 5/1/22 20:26





 


 Ascorbic Acid


  (Vitamin C)  250 mg  DAILY


 PO


   4/22/22 09:00


 5/2/22 09:51 DC 5/2/22 07:53





 


 Vitamin D


  (Vitamin D3)  1,000 unit  DAILY


 PO


   4/22/22 09:00


 5/2/22 09:51 DC 5/2/22 07:52





 


 Fluoxetine HCl


  (PROzac)  40 mg  DAILY


 PO


   4/22/22 09:00


 4/22/22 18:03 DC 4/22/22 09:50





 


 Cyanocobalamin


  (Vitamin B-12)  1,000 mcg  DAILY


 PO


   4/22/22 09:00


 5/2/22 09:51 DC 5/2/22 07:53





 


 Prednisone


  (Prednisone)  5 mg  DAILY


 PO


   4/22/22 09:00


 5/2/22 09:51 DC 5/2/22 08:07





 


 Fluticasone/


 Vilanterol


  (Breo Ellipta


 100-25 Mcg)  1 puff  DAILY


 INH


   4/22/22 09:00


 5/2/22 09:51 DC 5/2/22 07:54





 


 Acetaminophen


  (Tylenol)  650 mg  PRN Q6HRS  PRN


 PO


 MILD PAIN / TEMP > 100.3'F  4/21/22 20:30


 5/2/22 09:51 DC 5/2/22 07:53





 


 Multi-Ingredient


 Ointment


  (Analgesic Balm)  1 jose  PRN QID  PRN


 TP


 MUSCLE PAIN  4/21/22 20:30


 5/2/22 09:51 DC  





 


 Al Hydroxide/Mg


 Hydroxide


  (Mylanta Plus Xs)  15 ml  PRN AFTMEALHC  PRN


 PO


 DYSPEPSIA  4/21/22 20:30


 5/2/22 09:51 DC  





 


 Magnesium


 Hydroxide


  (Milk Of


 Magnesia)  2,400 mg  PRN QHS  PRN


 PO


 CONSTIPATION  4/21/22 20:30


 5/2/22 09:51 DC 4/30/22 21:14





 


 Gabapentin


  (Neurontin)  300 mg  TID


 PO


   4/22/22 09:30


 5/2/22 09:51 DC 5/2/22 07:53





 


 Potassium Chloride


  (Klor-Con)  20 meq  TID


 PO


   4/22/22 14:00


 4/22/22 12:28 DC  





 


 Potassium Chloride


  (Klor-Con)  20 meq  DAILY


 PO


   4/22/22 12:30


 4/23/22 15:19 DC  





 


 Donepezil HCl


  (Aricept)  10 mg  HS


 PO


   4/24/22 21:00


 4/24/22 21:25 DC  





 


 Memantine


  (Namenda)  10 mg  BID


 PO


   4/24/22 21:00


 4/24/22 21:23 DC  





 


 Sertraline HCl


  (Zoloft)  25 mg  DAILY


 PO


   4/25/22 09:00


 4/27/22 15:00 DC 4/27/22 08:50





 


 Sertraline HCl


  (Zoloft)  50 mg  DAILY


 PO


   4/28/22 09:00


 5/2/22 09:51 DC 5/2/22 07:53





 


 Quetiapine


 Fumarate


  (SEROquel)  12.5 mg  HS


 PO


   4/24/22 21:00


 5/2/22 09:51 DC 5/1/22 20:27





 


 Artificial Tears


  (Refresh Classic)  1 drop  PRN Q15MIN  PRN


 OU


 DRY EYE  4/30/22 09:45


 5/2/22 09:51 DC 4/30/22 12:13











I have reviewed the current psychotropics carefully including drug interactions.

 Risk benefit ratio favors no change other than as noted in my dictated progress

note.





Diagnosis:


Problems:  


(1) Psychotic disorder


(2) Impulse control disorder, unspecified


(3) Anxiety disorder, unspecified


(4) Dementia, vascular, with depression


(5) Dementia, vascular, with delusions


(6) Major neurocognitive disorder


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


(8) Dementia in Alzheimer's disease with delusions











SHELTON MONTOYA MD                  May 2, 2022 21:29

## 2022-05-02 NOTE — PDOC
Exam


Note:


Anuj Note:


This note is a late entry for 05/01/2022 covers elements not covered in my 

initial note.





Subjective:  The patient was seen individually on 05/01/2022, discussed and 

reviewed the chart with Aury SHARMA.  The patient slept 5 hours previous night.  

I met with her in her room.





Review of Systems:  No CV, , pulmonary, eye, ENT system symptoms on review.





Mental Status Exam:  Patient is oriented to herself and situation.  She was 

pleasant, verbal interactive, does have short-term memory deficits but did not 

have the obsessive questions about death and dying like she did yesterday.  

Speech coherent.  Abstraction fair.  Computation impaired.  Language function 

intact.  Mood and affect anxious.  Short-term memory is impaired.   No suicidal 

or homicidal ideation.





Laboratory Data:  Reviewed.





Impression:  Psychotic disorder unspecified.  Major neurocognitive disorder, 

Alzheimer, vascular with delusion, depression.  Anxiety disorder unspecified.  

Impulse control disorder unspecified.





Plan:  Continue rest psychotropics unchanged.  Reviewed drug interactions, risk-

benefit ratio.  Adjust as clinically indicated.  Transition to home with 

outpatient treatment tomorrow.





Assessment:


Vital Signs/I&O:





                                   Vital Signs








  Date Time  Temp Pulse Resp B/P (MAP) Pulse Ox O2 Delivery O2 Flow Rate FiO2


 


5/2/22 06:14 97.5 63 16 158/57 (90) 96   


 


5/1/22 15:52      Room Air  














                                    I & O   


 


 5/1/22 5/1/22 5/2/22





 15:00 23:00 07:00


 


Intake Total 560 ml 720 ml 


 


Balance 560 ml 720 ml 











Current Medications:


I have reviewed the current psychotropics carefully including drug interactions.

 Risk benefit ratio favors no change other than as noted in my dictated progress

note.





Diagnosis:


Problems:  


(1) Psychotic disorder


(2) Impulse control disorder, unspecified


(3) Anxiety disorder, unspecified


(4) Dementia, vascular, with depression


(5) Dementia, vascular, with delusions


(6) Major neurocognitive disorder


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


(8) Dementia in Alzheimer's disease with delusions











SHELTON MONTOYA MD                  May 2, 2022 07:00

## 2022-05-02 NOTE — PDOC
Exam


Note:


Anuj Note:


This note is a late entry for 04/30/2022 covers elements not covered in my 

initial note.





Subjective:  The patient was seen individually on 04/30/2022, discussed and 

reviewed the chart with Aury SHARMA.  The patient slept 6 hours previous night.  

She visited her significant other earlier in the day.  She does have short-term 

memory deficits, otherwise, pleasant, cooperative, not aggressive nor 

delusional.





Review of Systems:  No CV, , pulmonary, eye, ENT system symptoms on review.





Mental Status Exam:  Patient is oriented to herself and situation.  I met with 

her in her room at length.  She had many questions about her diagnosis, 

prognosis, how many years she would be alive and we have a very lengthy 

discussion about all of this.  Speech coherent.  Abstraction fair.  Computation 

impaired.  Language function intact.  Mood and affect anxious.  Short-term 

memory is impaired.   No suicidal or homicidal ideation.





Laboratory Data:  Reviewed.





Impression:  Psychotic disorder unspecified.  Major neurocognitive disorder, 

Alzheimer, vascular with delusion, depression.  Anxiety disorder unspecified.  

Impulse control disorder unspecified.





Plan:  Continue rest psychotropics unchanged.  Reviewed drug interactions, risk-

benefit ratio.  Adjust as clinically indicated.





Assessment:


Vital Signs/I&O:





                                   Vital Signs








  Date Time  Temp Pulse Resp B/P (MAP) Pulse Ox O2 Delivery O2 Flow Rate FiO2


 


5/2/22 06:14 97.5 63 16 158/57 (90) 96   


 


5/1/22 15:52      Room Air  














                                    I & O   


 


 5/1/22 5/1/22 5/2/22





 14:59 22:59 06:59


 


Intake Total 560 ml 720 ml 


 


Balance 560 ml 720 ml 











Current Medications:


I have reviewed the current psychotropics carefully including drug interactions.

 Risk benefit ratio favors no change other than as noted in my dictated progress

note.





Diagnosis:


Problems:  


(1) Psychotic disorder


(2) Impulse control disorder, unspecified


(3) Anxiety disorder, unspecified


(4) Dementia, vascular, with depression


(5) Dementia, vascular, with delusions


(6) Major neurocognitive disorder


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


(8) Dementia in Alzheimer's disease with delusions











SHELTON MONTOYA MD                  May 2, 2022 06:47

## 2022-05-03 NOTE — DS
DATE OF DISCHARGE: 05/02/2022

DISCHARGE SUMMARY/PSYCHIATRIC PROGRESS NOTE



This is a late entry 05/02, covers elements not covered in my initial note.



REASON FOR ADMISSION:  Please refer to the admission history for details.  

Briefly, the patient is a 77-year-old  female referred from home from 

where she was admitted to 1 [facility] surgical floor.  She has been 

increasingly confused, was living with her boyfriend.  She has been belligerent,

cursing, delusional, unsafe at home.  She was agitated, aggressive towards her 

significant other.  She did not seem to recognize her significant other 

frequently and believes staff members murdered her .  She was having 

progressive dementia with short-term memory deficits, depressed, delusional, had

failed outpatient psychiatric intervention resulting in this referral.



SIGNIFICANT FINDINGS AND CLINICAL COURSE:  Following admission, the patient was 

seen daily individually by myself from a psychiatric standpoint, medical 

followup, Dr. Ames/Dr. Ace.  The patient was quite depressed, anxious with 

worsening confusion in the evening.  Adjustments were made in her psychotropic. 

She seemed to respond to a combination of Zoloft 25 mg a day, which was 

increased to 50 mg a day on 04/08, Seroquel 12.5 mg at bedtime, gabapentin 300 

mg t.i.d., Namenda 10 mg b.i.d., Aricept 10 mg a day.



REVIEW OF SYSTEMS:  Prior to discharge 05/02, no CV, , pulmonary, eye, ENT 

system symptoms on review.



MENTAL STATUS EXAMINATION:  Oriented to herself, situation.  Speech coherent.  

Abstraction fair.  Computation impaired.  Language function intact.  Attention 

span short.  Mood and affect improved.  She was more animated on discharge.  

Short-term memory would worsen in the evening.



FINAL DIAGNOSES:  Major neurocognitive disorder, Alzheimer, vascular with 

delusion, depression.  Major depressive disorder, rule out psychotic features; 

anxiety disorder, unspecified; impulse control disorder, unspecified.  Rest 

unchanged from admission.



DISCHARGE MEDICATIONS:  Please refer to the MRAD.



DISCHARGE INSTRUCTIONS:  Outpatient psychiatric and medical followup as arranged

prior to discharge.



Time for discharge day management greater than 30 minutes.







MMA/MAR

DR: Gerson   DD: 05/03/2022 21:28

DT: 05/03/2022 23:42   TID: [id number]

## 2023-10-31 NOTE — PDOC
Exam


Note:


Anuj Note:


This note is a late entry for 04/23/2022 covers elements not covered in my 

initial note.





Subjective:  The patient was seen individually on 04/23/2022, discussed and 

reviewed the chart with Aury SHARMA.  The patient slept 5-1/2 hours previous 

night.  I met with the patient at length in her room.  She did have a visit with

her boyfriend which seems to go better.  Reportedly CT head shows normal 

pressure hydrocephalus but she has been seen by a neurologist Dr. Medina in the 

past and we are getting those records.





Review of Systems:  No CV, , pulmonary, eye, ENT system symptoms on review.  

Positive for short-term memory deficits.





Mental Status Exam:  Patient is oriented to herself and situation.  Speech 

coherent has some latency.  Abstraction fair.  Computation impaired.  Language 

function intact.  Attention span short.  Mood and affect withdrawn.





Laboratory Data:  Reviewed.





Impression:  Psychotic disorder unspecified.  Major neurocognitive disorder, 

Alzheimer, vascular with delusion, depression.  Anxiety disorder unspecified.  

Impulse control disorder unspecified.





Plan:  Continue current psychotropics Aricept, gabapentin, duloxetine, Namenda. 

Reviewed drug interactions, risk-benefit ratio.  Await Neurology records, may 

consult Dr. [facility].





Assessment:


Vital Signs/I&O:





                                   Vital Signs








  Date Time  Temp Pulse Resp B/P (MAP) Pulse Ox O2 Delivery O2 Flow Rate FiO2


 


4/24/22 08:08  78  157/81    


 


4/24/22 06:44 98.0  18  95 Room Air  














                                    I & O   


 


 4/23/22 4/23/22 4/24/22





 15:00 23:00 07:00


 


Intake Total 500 ml 600 ml 


 


Balance 500 ml 600 ml 











Current Medications:


I have reviewed the current psychotropics carefully including drug interactions.

 Risk benefit ratio favors no change other than as noted in my dictated progress

note.





Diagnosis:


Problems:  


(1) Psychotic disorder


(2) Impulse control disorder, unspecified


(3) Anxiety disorder, unspecified


(4) Dementia, vascular, with depression


(5) Dementia, vascular, with delusions


(6) Major neurocognitive disorder


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


(8) Dementia in Alzheimer's disease with delusions











SHELTON MONTOYA MD                 Apr 24, 2022 08:35 Patient
